# Patient Record
Sex: FEMALE | Race: BLACK OR AFRICAN AMERICAN | NOT HISPANIC OR LATINO | Employment: FULL TIME | URBAN - NONMETROPOLITAN AREA
[De-identification: names, ages, dates, MRNs, and addresses within clinical notes are randomized per-mention and may not be internally consistent; named-entity substitution may affect disease eponyms.]

---

## 2017-01-04 RX ORDER — DOCUSATE SODIUM 100 MG/1
100 CAPSULE, LIQUID FILLED ORAL DAILY PRN
Qty: 30 CAPSULE | Refills: 10 | Status: SHIPPED | OUTPATIENT
Start: 2017-01-04 | End: 2017-04-04

## 2017-01-04 RX ORDER — DOXYCYCLINE HYCLATE 50 MG/1
324 CAPSULE, GELATIN COATED ORAL
Qty: 90 TABLET | Refills: 10 | Status: SHIPPED | OUTPATIENT
Start: 2017-01-04 | End: 2017-04-04

## 2017-01-10 ENCOUNTER — HOSPITAL ENCOUNTER (OUTPATIENT)
Facility: HOSPITAL | Age: 22
Setting detail: SURGERY ADMIT
End: 2017-01-10
Attending: OBSTETRICS & GYNECOLOGY | Admitting: OBSTETRICS & GYNECOLOGY

## 2017-01-13 ENCOUNTER — ROUTINE PRENATAL (OUTPATIENT)
Dept: OBSTETRICS AND GYNECOLOGY | Facility: CLINIC | Age: 22
End: 2017-01-13

## 2017-01-13 VITALS — SYSTOLIC BLOOD PRESSURE: 106 MMHG | DIASTOLIC BLOOD PRESSURE: 60 MMHG | WEIGHT: 157 LBS

## 2017-01-13 DIAGNOSIS — Z36.9 ENCOUNTER FOR ANTENATAL SCREENING: ICD-10-CM

## 2017-01-13 DIAGNOSIS — Z34.83 SUPERVISION OF NORMAL INTRAUTERINE PREGNANCY IN MULTIGRAVIDA IN THIRD TRIMESTER: Primary | ICD-10-CM

## 2017-01-13 DIAGNOSIS — Z3A.35 35 WEEKS GESTATION OF PREGNANCY: ICD-10-CM

## 2017-01-13 PROCEDURE — 99212 OFFICE O/P EST SF 10 MIN: CPT | Performed by: OBSTETRICS & GYNECOLOGY

## 2017-01-13 NOTE — MR AVS SNAPSHOT
Jo Ybarra   2017 9:00 AM   Routine Prenatal    Dept Phone:  [phone]   Encounter #:  [id number]    Provider:  Homero Humphreys MD   Department:  [facility]                Your Full Care Plan              Your Updated Medication List          This list is accurate as of: 17  9:36 AM.  Always use your most recent med list.                docusate sodium 100 MG capsule   Commonly known as:  COLACE   Take 1 capsule by mouth Daily As Needed for constipation.       ferrous gluconate 324 MG tablet   Commonly known as:  FERGON   Take 1 tablet by mouth 3 (Three) Times a Day With Meals.       prenatal vitamin 27-0.8 27-0.8 MG tablet tablet               We Performed the Following     Group B Strep (Molecular)       You Were Diagnosed With        Codes Comments    Supervision of normal intrauterine pregnancy in multigravida in third trimester    -  Primary ICD-10-CM: Z34.83  ICD-9-CM: V22.1     Fracture of both fibulas, open type I or II, with nonunion, subsequent encounter     ICD-10-CM: S82.401M, S82.402M  ICD-9-CM: 733.82     35 weeks gestation of pregnancy     ICD-10-CM: Z3A.35  ICD-9-CM: V22.2     Encounter for  screening     ICD-10-CM: Z36  ICD-9-CM: V28.9       Instructions     None    Patient Instructions History      Upcoming Appointments     Visit Type Date Time Department    OB FOLLOWUP 2017  9:00 AM [facility]    OB FOLLOWUP 2017  9:30 AM [facility] [place] Signup     Our records indicate that you have declined [facility] sign[facility]. If you would like to sign up for [facility], please email [email or web address] or call [phone] to obtain an activation code.             Other Info from Your Visit           Your Appointments     2017  9:30 AM CST   OB FOLLOWUP with Homero Humphreys MD   [facility] (--)    [address] [place] [address] [address]  [facility] [address]   [address]  [facility] [address]   [phone]              Allergies     No Known Allergies      Reason for Visit     Routine Prenatal Visit           Vital Signs     Blood Pressure Weight Smoking Status             106/60 157 lb (71.2 kg) Never Smoker         Problems and Diagnoses Noted     35 weeks gestation of pregnancy    Supervision of normal intrauterine pregnancy in multigravida in third trimester    Fracture of both fibulas, open type I or II, with nonunion, subsequent encounter        Encounter for  screening          No Longer an Issue     33 weeks gestation of pregnancy

## 2017-01-20 ENCOUNTER — ROUTINE PRENATAL (OUTPATIENT)
Dept: OBSTETRICS AND GYNECOLOGY | Facility: CLINIC | Age: 22
End: 2017-01-20

## 2017-01-20 VITALS — WEIGHT: 159 LBS | SYSTOLIC BLOOD PRESSURE: 104 MMHG | DIASTOLIC BLOOD PRESSURE: 58 MMHG

## 2017-01-20 DIAGNOSIS — Z3A.36 36 WEEKS GESTATION OF PREGNANCY: ICD-10-CM

## 2017-01-20 DIAGNOSIS — Z34.83 SUPERVISION OF NORMAL INTRAUTERINE PREGNANCY IN MULTIGRAVIDA IN THIRD TRIMESTER: Primary | ICD-10-CM

## 2017-01-20 PROBLEM — Z3A.35 35 WEEKS GESTATION OF PREGNANCY: Status: RESOLVED | Noted: 2017-01-13 | Resolved: 2017-01-20

## 2017-01-20 PROCEDURE — 99212 OFFICE O/P EST SF 10 MIN: CPT | Performed by: OBSTETRICS & GYNECOLOGY

## 2017-01-20 NOTE — MR AVS SNAPSHOT
Jo Ybarra   1/20/2017 9:30 AM   Routine Prenatal    Dept Phone:  [phone]   Encounter #:  [id number]    Provider:  Homero Humphreys MD   Department:  [facility]                Your Full Care Plan              Your Updated Medication List          This list is accurate as of: 1/20/17 10:15 AM.  Always use your most recent med list.                docusate sodium 100 MG capsule   Commonly known as:  COLACE   Take 1 capsule by mouth Daily As Needed for constipation.       ferrous gluconate 324 MG tablet   Commonly known as:  FERGON   Take 1 tablet by mouth 3 (Three) Times a Day With Meals.       prenatal vitamin 27-0.8 27-0.8 MG tablet tablet               You Were Diagnosed With        Codes Comments    Supervision of normal intrauterine pregnancy in multigravida in third trimester    -  Primary ICD-10-CM: Z34.83  ICD-9-CM: V22.1     36 weeks gestation of pregnancy     ICD-10-CM: Z3A.36  ICD-9-CM: V22.2       Instructions     None    Patient Instructions History      Upcoming Appointments     Visit Type Date Time Department    OB FOLLOWUP 1/20/2017  9:30 AM [facility]    OB FOLLOWUP 1/27/2017 10:00 AM [facility]      MyChart Signup     Our records indicate that you have declined [facility] signup. If you would like to sign up for [employer], please email [email or web address] or call [phone] to obtain an activation code.             Other Info from Your Visit           Your Appointments     Jan 27, 2017 10:00 AM CST   OB FOLLOWUP with Homero Humphreys MD   [facility] (--)    [address] [place] [address] [address]  [facility] [address] [place] [address]   [phone]              Other Notes About Your Plan     GBS +        Allergies     No Known Allergies      Reason for Visit     Routine Prenatal Visit           Vital Signs     Blood Pressure Weight Smoking Status             104/58 159 lb (72.1 kg)  Never Smoker         Problems and Diagnoses Noted     36 weeks gestation of pregnancy    Supervision of normal intrauterine pregnancy in multigravida in third trimester      No Longer an Issue     35 weeks gestation of pregnancy

## 2017-01-27 ENCOUNTER — ROUTINE PRENATAL (OUTPATIENT)
Dept: OBSTETRICS AND GYNECOLOGY | Facility: CLINIC | Age: 22
End: 2017-01-27

## 2017-01-27 VITALS — DIASTOLIC BLOOD PRESSURE: 60 MMHG | SYSTOLIC BLOOD PRESSURE: 94 MMHG

## 2017-01-27 DIAGNOSIS — Z34.83 SUPERVISION OF NORMAL INTRAUTERINE PREGNANCY IN MULTIGRAVIDA IN THIRD TRIMESTER: Primary | ICD-10-CM

## 2017-01-27 DIAGNOSIS — Z3A.37 37 WEEKS GESTATION OF PREGNANCY: ICD-10-CM

## 2017-01-27 PROBLEM — Z3A.36 36 WEEKS GESTATION OF PREGNANCY: Status: RESOLVED | Noted: 2017-01-20 | Resolved: 2017-01-27

## 2017-01-27 PROCEDURE — 99212 OFFICE O/P EST SF 10 MIN: CPT | Performed by: OBSTETRICS & GYNECOLOGY

## 2017-01-27 NOTE — PROGRESS NOTES
Chart is more complete on [facility]CO[facility]  FHT 150s today  Doing well at today's visit  Prior C/S x1, scheduled for repeat 2/6  GBS +

## 2017-02-03 ENCOUNTER — ROUTINE PRENATAL (OUTPATIENT)
Dept: OBSTETRICS AND GYNECOLOGY | Facility: CLINIC | Age: 22
End: 2017-02-03

## 2017-02-03 VITALS — WEIGHT: 160 LBS | SYSTOLIC BLOOD PRESSURE: 88 MMHG | DIASTOLIC BLOOD PRESSURE: 62 MMHG

## 2017-02-03 DIAGNOSIS — Z34.83 SUPERVISION OF NORMAL INTRAUTERINE PREGNANCY IN MULTIGRAVIDA IN THIRD TRIMESTER: Primary | ICD-10-CM

## 2017-02-03 DIAGNOSIS — Z3A.38 38 WEEKS GESTATION OF PREGNANCY: ICD-10-CM

## 2017-02-03 PROCEDURE — 99212 OFFICE O/P EST SF 10 MIN: CPT | Performed by: OBSTETRICS & GYNECOLOGY

## 2017-02-05 ENCOUNTER — HOSPITAL ENCOUNTER (OUTPATIENT)
Dept: LABOR AND DELIVERY | Facility: HOSPITAL | Age: 22
Setting detail: SURGERY ADMIT
Discharge: HOME | End: 2017-02-05

## 2017-02-05 VITALS — WEIGHT: 158 LBS | HEIGHT: 61 IN | BODY MASS INDEX: 29.83 KG/M2

## 2017-02-06 ENCOUNTER — ANESTHESIA (OUTPATIENT)
Dept: LABOR AND DELIVERY | Facility: HOSPITAL | Age: 22
End: 2017-02-06

## 2017-02-06 ENCOUNTER — HOSPITAL ENCOUNTER (INPATIENT)
Facility: HOSPITAL | Age: 22
LOS: 2 days | Discharge: HOME | End: 2017-02-08
Attending: OBSTETRICS & GYNECOLOGY | Admitting: OBSTETRICS & GYNECOLOGY

## 2017-02-06 ENCOUNTER — ANESTHESIA EVENT (OUTPATIENT)
Dept: LABOR AND DELIVERY | Facility: HOSPITAL | Age: 22
End: 2017-02-06

## 2017-02-06 DIAGNOSIS — D62 ACUTE BLOOD LOSS ANEMIA: Primary | ICD-10-CM

## 2017-02-06 PROBLEM — O36.5990 IUGR (INTRAUTERINE GROWTH RESTRICTION) AFFECTING CARE OF MOTHER: Status: ACTIVE | Noted: 2017-02-06

## 2017-02-06 LAB
ABO GROUP BLD: NORMAL
AMPHET+METHAMPHET UR QL: NEGATIVE
BARBITURATES UR QL SCN: NEGATIVE
BENZODIAZ UR QL SCN: NEGATIVE
BLD GP AB SCN SERPL QL: NEGATIVE
CANNABINOIDS SERPL QL: NEGATIVE
COCAINE UR QL: NEGATIVE
DEPRECATED RDW RBC AUTO: 40 FL (ref 36.4–46.3)
ERYTHROCYTE [DISTWIDTH] IN BLOOD BY AUTOMATED COUNT: 14.6 % (ref 11.5–14.5)
HCT VFR BLD AUTO: 28.1 % (ref 35–45)
HGB BLD-MCNC: 9.1 G/DL (ref 12–15.5)
Lab: NORMAL
MCH RBC QN AUTO: 24.3 PG (ref 26.5–34)
MCHC RBC AUTO-ENTMCNC: 32.4 G/DL (ref 31.4–36)
MCV RBC AUTO: 74.9 FL (ref 80–98)
METHADONE UR QL SCN: NEGATIVE
OPIATES UR QL: NEGATIVE
OXYCODONE UR QL SCN: NEGATIVE
PLATELET # BLD AUTO: 246 10*3/MM3 (ref 150–450)
PMV BLD AUTO: 11.3 FL (ref 8–12)
RBC # BLD AUTO: 3.75 10*6/MM3 (ref 3.77–5.16)
RH BLD: POSITIVE
WBC NRBC COR # BLD: 10.09 10*3/MM3 (ref 3.2–9.8)

## 2017-02-06 PROCEDURE — 25010000002 MORPHINE PER 10 MG: Performed by: NURSE ANESTHETIST, CERTIFIED REGISTERED

## 2017-02-06 PROCEDURE — 25010000002 PHENYLEPHRINE PER 1 ML: Performed by: NURSE ANESTHETIST, CERTIFIED REGISTERED

## 2017-02-06 PROCEDURE — 25010000003 CEFAZOLIN PER 500 MG: Performed by: OBSTETRICS & GYNECOLOGY

## 2017-02-06 PROCEDURE — 86900 BLOOD TYPING SEROLOGIC ABO: CPT

## 2017-02-06 PROCEDURE — 80307 DRUG TEST PRSMV CHEM ANLYZR: CPT | Performed by: OBSTETRICS & GYNECOLOGY

## 2017-02-06 PROCEDURE — 59515 CESAREAN DELIVERY: CPT | Performed by: OBSTETRICS & GYNECOLOGY

## 2017-02-06 PROCEDURE — 86850 RBC ANTIBODY SCREEN: CPT

## 2017-02-06 PROCEDURE — 51703 INSERT BLADDER CATH COMPLEX: CPT

## 2017-02-06 PROCEDURE — 25010000002 FENTANYL CITRATE (PF) 100 MCG/2ML SOLUTION: Performed by: NURSE ANESTHETIST, CERTIFIED REGISTERED

## 2017-02-06 PROCEDURE — 86901 BLOOD TYPING SEROLOGIC RH(D): CPT

## 2017-02-06 PROCEDURE — 85027 COMPLETE CBC AUTOMATED: CPT | Performed by: OBSTETRICS & GYNECOLOGY

## 2017-02-06 RX ORDER — FERROUS SULFATE TAB EC 324 MG (65 MG FE EQUIVALENT) 324 (65 FE) MG
324 TABLET DELAYED RESPONSE ORAL
Status: DISCONTINUED | OUTPATIENT
Start: 2017-02-06 | End: 2017-02-07 | Stop reason: SDUPTHER

## 2017-02-06 RX ORDER — MORPHINE SULFATE 2 MG/ML
2 INJECTION, SOLUTION INTRAMUSCULAR; INTRAVENOUS
Status: DISCONTINUED | OUTPATIENT
Start: 2017-02-06 | End: 2017-02-08 | Stop reason: HOSPADM

## 2017-02-06 RX ORDER — BISACODYL 10 MG
10 SUPPOSITORY, RECTAL RECTAL DAILY PRN
Status: DISCONTINUED | OUTPATIENT
Start: 2017-02-07 | End: 2017-02-08 | Stop reason: HOSPADM

## 2017-02-06 RX ORDER — ZOLPIDEM TARTRATE 5 MG/1
5 TABLET ORAL NIGHTLY PRN
Status: DISCONTINUED | OUTPATIENT
Start: 2017-02-06 | End: 2017-02-08 | Stop reason: HOSPADM

## 2017-02-06 RX ORDER — ACETAMINOPHEN 500 MG
1000 TABLET ORAL EVERY 6 HOURS PRN
Status: DISCONTINUED | OUTPATIENT
Start: 2017-02-06 | End: 2017-02-08 | Stop reason: HOSPADM

## 2017-02-06 RX ORDER — SODIUM CHLORIDE, SODIUM LACTATE, POTASSIUM CHLORIDE, CALCIUM CHLORIDE 600; 310; 30; 20 MG/100ML; MG/100ML; MG/100ML; MG/100ML
INJECTION, SOLUTION INTRAVENOUS
Status: DISPENSED
Start: 2017-02-06 | End: 2017-02-06

## 2017-02-06 RX ORDER — MORPHINE SULFATE 1 MG/ML
INJECTION, SOLUTION EPIDURAL; INTRATHECAL; INTRAVENOUS AS NEEDED
Status: DISCONTINUED | OUTPATIENT
Start: 2017-02-06 | End: 2017-02-06 | Stop reason: SURG

## 2017-02-06 RX ORDER — NALOXONE HCL 0.4 MG/ML
0.04 VIAL (ML) INJECTION
Status: DISCONTINUED | OUTPATIENT
Start: 2017-02-06 | End: 2017-02-08 | Stop reason: HOSPADM

## 2017-02-06 RX ORDER — SODIUM CHLORIDE, SODIUM LACTATE, POTASSIUM CHLORIDE, CALCIUM CHLORIDE 600; 310; 30; 20 MG/100ML; MG/100ML; MG/100ML; MG/100ML
INJECTION, SOLUTION INTRAVENOUS
Status: COMPLETED
Start: 2017-02-06 | End: 2017-02-06

## 2017-02-06 RX ORDER — SODIUM CHLORIDE, SODIUM LACTATE, POTASSIUM CHLORIDE, CALCIUM CHLORIDE 600; 310; 30; 20 MG/100ML; MG/100ML; MG/100ML; MG/100ML
125 INJECTION, SOLUTION INTRAVENOUS CONTINUOUS
Status: DISCONTINUED | OUTPATIENT
Start: 2017-02-06 | End: 2017-02-06

## 2017-02-06 RX ORDER — OXYCODONE HYDROCHLORIDE AND ACETAMINOPHEN 5; 325 MG/1; MG/1
2 TABLET ORAL EVERY 4 HOURS PRN
Status: DISCONTINUED | OUTPATIENT
Start: 2017-02-06 | End: 2017-02-08 | Stop reason: HOSPADM

## 2017-02-06 RX ORDER — FENTANYL CITRATE 50 UG/ML
INJECTION, SOLUTION INTRAMUSCULAR; INTRAVENOUS AS NEEDED
Status: DISCONTINUED | OUTPATIENT
Start: 2017-02-06 | End: 2017-02-06 | Stop reason: SURG

## 2017-02-06 RX ORDER — ONDANSETRON 2 MG/ML
4 INJECTION INTRAMUSCULAR; INTRAVENOUS ONCE AS NEEDED
Status: ACTIVE | OUTPATIENT
Start: 2017-02-06 | End: 2017-02-07

## 2017-02-06 RX ORDER — OXYCODONE HYDROCHLORIDE AND ACETAMINOPHEN 5; 325 MG/1; MG/1
1 TABLET ORAL EVERY 4 HOURS PRN
Status: DISCONTINUED | OUTPATIENT
Start: 2017-02-06 | End: 2017-02-08 | Stop reason: HOSPADM

## 2017-02-06 RX ORDER — OXYTOCIN/RINGER'S LACTATE 20/1000 ML
PLASTIC BAG, INJECTION (ML) INTRAVENOUS
Status: COMPLETED
Start: 2017-02-06 | End: 2017-02-06

## 2017-02-06 RX ORDER — SODIUM CHLORIDE 0.9 % (FLUSH) 0.9 %
1-10 SYRINGE (ML) INJECTION AS NEEDED
Status: DISCONTINUED | OUTPATIENT
Start: 2017-02-06 | End: 2017-02-06

## 2017-02-06 RX ORDER — DOCUSATE SODIUM 100 MG/1
100 CAPSULE, LIQUID FILLED ORAL 2 TIMES DAILY PRN
Status: DISCONTINUED | OUTPATIENT
Start: 2017-02-06 | End: 2017-02-08 | Stop reason: HOSPADM

## 2017-02-06 RX ORDER — PRENATAL VIT/IRON FUM/FOLIC AC 27MG-0.8MG
1 TABLET ORAL DAILY
Status: DISCONTINUED | OUTPATIENT
Start: 2017-02-06 | End: 2017-02-08 | Stop reason: HOSPADM

## 2017-02-06 RX ORDER — BUPIVACAINE HYDROCHLORIDE 7.5 MG/ML
INJECTION, SOLUTION EPIDURAL; RETROBULBAR AS NEEDED
Status: DISCONTINUED | OUTPATIENT
Start: 2017-02-06 | End: 2017-02-06 | Stop reason: SURG

## 2017-02-06 RX ORDER — OXYTOCIN/RINGER'S LACTATE 20/1000 ML
150 PLASTIC BAG, INJECTION (ML) INTRAVENOUS CONTINUOUS
Status: ACTIVE | OUTPATIENT
Start: 2017-02-06 | End: 2017-02-07

## 2017-02-06 RX ORDER — IBUPROFEN 800 MG/1
800 TABLET ORAL EVERY 8 HOURS SCHEDULED
Status: DISCONTINUED | OUTPATIENT
Start: 2017-02-06 | End: 2017-02-08 | Stop reason: HOSPADM

## 2017-02-06 RX ORDER — SODIUM CHLORIDE 0.9 % (FLUSH) 0.9 %
1-10 SYRINGE (ML) INJECTION AS NEEDED
Status: DISCONTINUED | OUTPATIENT
Start: 2017-02-06 | End: 2017-02-08 | Stop reason: HOSPADM

## 2017-02-06 RX ORDER — DOCUSATE SODIUM 100 MG/1
100 CAPSULE, LIQUID FILLED ORAL DAILY PRN
Status: DISCONTINUED | OUTPATIENT
Start: 2017-02-06 | End: 2017-02-08 | Stop reason: SDUPTHER

## 2017-02-06 RX ADMIN — MORPHINE SULFATE 0.15 MG: 1 INJECTION, SOLUTION EPIDURAL; INTRATHECAL; INTRAVENOUS at 07:35

## 2017-02-06 RX ADMIN — IBUPROFEN 800 MG: 800 TABLET ORAL at 14:14

## 2017-02-06 RX ADMIN — Medication 150 ML/HR: at 12:05

## 2017-02-06 RX ADMIN — Medication 200 ML: at 07:55

## 2017-02-06 RX ADMIN — Medication 100 ML: at 07:50

## 2017-02-06 RX ADMIN — PHENYLEPHRINE HYDROCHLORIDE 50 MCG: 10 INJECTION INTRAVENOUS at 07:40

## 2017-02-06 RX ADMIN — FENTANYL CITRATE 15 MCG: 50 INJECTION, SOLUTION INTRAMUSCULAR; INTRAVENOUS at 07:35

## 2017-02-06 RX ADMIN — BUPIVACAINE HYDROCHLORIDE 1.4 ML: 7.5 INJECTION, SOLUTION EPIDURAL; RETROBULBAR at 07:35

## 2017-02-06 RX ADMIN — SODIUM CHLORIDE, POTASSIUM CHLORIDE, SODIUM LACTATE AND CALCIUM CHLORIDE: 600; 310; 30; 20 INJECTION, SOLUTION INTRAVENOUS at 07:28

## 2017-02-06 RX ADMIN — CEFAZOLIN SODIUM 2 G: 1 INJECTION, POWDER, FOR SOLUTION INTRAMUSCULAR; INTRAVENOUS at 07:37

## 2017-02-06 RX ADMIN — PHENYLEPHRINE HYDROCHLORIDE 100 MCG: 10 INJECTION INTRAVENOUS at 07:37

## 2017-02-06 NOTE — H&P
Jo Ybarra  : 1995  MRN: [id number]  [facility]: [id number]    History and Physical    Jo Ybarra is a 21 y.o. year old  with an Estimated Date of Delivery: 17 presenting for  delivery due to prior C/S.  She is not planning for sterilization at the time of the .    Her prenatal care has been benign.    Obstetric History       T1      TAB0   SAB0   E0   M0   L1       # Outcome Date GA Lbr Patel/2nd Weight Sex Delivery Anes PTL Lv   2 Current            1 Term 11   7 lb 8 oz (3402 g) M CS-LTranv Spinal N Y      Complications: Failure to Progress in First Stage        Past Medical History   Diagnosis Date   • Abdominal pain    • Acute bronchitis    • Acute gastroenteritis    • Acute tonsillitis    • Allergic rhinitis    • Common cold    • Cough    • Dysuria    • Eczema    • Encounter for general counseling on prescription of oral contraceptives    • Fever    • Follow-up exam after treatment      encounter for follow-up exam after completed treatment for conditions other than malignant neoplasm   • Headache    • Health maintenance examination      individual health exam   • Helminth infection      possible roundworm   • Infection of skin      localized left ear   • Infectious gastroenteritis    • Right lower quadrant pain    • Streptococcal sore throat    • Surveillance of implantable subdermal contraceptive      .   • Upper respiratory infection    • Vaginal discharge      Past Surgical History   Procedure Laterality Date   •  section primary  2011   • Injection of medication  2013     toradol   • [place] tooth extraction         Current Facility-Administered Medications:   •  ceFAZolin (ANCEF) 2 g in sodium chloride 0.9 % 100 mL IVPB, 2 g, Intravenous, Once, Homero Humphreys MD  •  citric acid-sodium citrate (BICITRA) solution 30 mL, 30 mL, Oral, Once, Homero Humphreys MD  •  lactated ringers bolus 1,000 mL, 1,000 mL,  Intravenous, Once, Homero Humphreys MD  •  lactated ringers infusion, 125 mL/hr, Intravenous, Continuous, Homero Humphreys MD  •  sodium chloride 0.9 % flush 1-10 mL, 1-10 mL, Intravenous, PRN, Homero Humphreys MD    No Known Allergies  Smoking status: Former Smoker                                                              Packs/day: 0.25      Years: 2.00         Types: Cigarettes     Quit date: 2016     Smokeless status: Not on file                     Comment: pt declined    Review of Systems    There were no vitals taken for this visit.  General: well developed; well nourished  no acute distress   Heart: Not performed.   Lungs: breathing is unlabored   Abdomen: soft, non-tender; no masses  no umbilical or inginual hernias are present  no hepato-splenomegaly     Prenatal Labs  Lab Results   Component Value Date    HEPBSAG Negative 2016    ABO O 2016    RH POS 2016    HEPCVIRUSABY Negative 2016       Recent Labs  Lab Results   Component Value Date    HGB 9.2 (L) 2016    HCT 28.6 (L) 2016    WBC 9.8 2016     2016        Assessment   1. IUP with an Estimated Date of Delivery: 17  2. Planned  section due to previous C/S - declines   3. GBS +     Plan   Repeat  The risks, benefits. and alternatives to  section were discussed.  The risks that were discussed included, but were not limited to, pain, infection, bleeding, hemorrhage; including need for transfusion to which she would have no objection; injury to the bowel, bladder, uterus, tubes, ovaries, or baby which could require further surgery or prolonged hospitalization.  The patient understands that we'll have leg pumps on her legs to try and reduce the risk of clot formation her legs.  She understands that we will have early ambulation to also reduce the risk of blood clot formation and to reduce the risk of pneumonia.  She will be given antibiotics prior  to her surgery to help decrease the risk for infection.  All questions were answered.        This document has been electronically signed by Homero Humphreys MD on February 6, 2017 7:01 AM

## 2017-02-06 NOTE — ANESTHESIA POSTPROCEDURE EVALUATION
Patient: Jo Ybarra    Procedure Summary     Date Anesthesia Start Anesthesia Stop Room / Location    17 0729 0826 [facility] LABOR DELIVERY  / [facility] LABOR DELIVERY       Procedure Diagnosis Surgeon Provider     SECTION REPEAT (N/A Abdomen) No diagnosis on file. MD Lai Jones CRNA          Anesthesia Type: spinal  Last vitals  BP      Temp      Pulse     Resp      SpO2        Post Anesthesia Care and Evaluation    Patient location during evaluation: bedside  Patient participation: complete - patient participated  Level of consciousness: awake and alert  Pain management: adequate  Airway patency: patent  Anesthetic complications: No anesthetic complications  PONV Status: none  Cardiovascular status: acceptable  Respiratory status: acceptable  Hydration status: acceptable  Post Neuraxial Block status: No signs or symptoms of PDPH

## 2017-02-06 NOTE — ANESTHESIA PROCEDURE NOTES
Spinal Block    Patient location during procedure: OR  Indication:at surgeon's request  Performed By  CRNA: ALICE QUINTANA  Preanesthetic Checklist  Completed: patient identified, site marked, surgical consent, pre-op evaluation, timeout performed, IV checked, risks and benefits discussed and monitors and equipment checked  Spinal Block Prep:  Patient Position:sitting  Sterile Tech:cap, gloves, gown, mask and sterile barriers  Prep:DuraPrep  Patient Monitoring:blood pressure monitoring, continuous pulse oximetry and EKG  Spinal Block Procedure  Approach:midline  Guidance:landmark technique and palpation technique  Location:L3-L4  Needle Type:Pencan  Needle Gauge:24 G  Fluid Appearance:clear  Post Assessment  Patient Tolerance:patient tolerated the procedure well with no apparent complications  Complications no

## 2017-02-06 NOTE — OP NOTE
[facility]  Jo Ybarra  : 1995  MRN: [id number]  CSN: [id number]     Section Operative Note    Pre-Operative Dx:   1. IUP at 39w1d weeks   2. Prior C/S x1      Postoperative dx:    1. Same as above       Procedure: Repeat  (LTCS)       Surgeon: Homero Humphreys MD   Assistant: Rubi Davidson       Anesthesia: Spinal        EBL: 500 mls.   IV Fluids: 600 mls.         Antibiotics: cefazolin 1 gms     Infant:           Gender: female  infant    Weight: No birth weight on file.     Apgars:    @ 1 minute /        @ 5 minutes    Cord gases: Venous:  @BABYNOHDR(BRIEFLAB, PHCVEN, BECVEN)@     Arterial:  @BABYNOHDR(BRIEFLAB, PHCART, BECART)@     Procedure Details:   Prepped and draped in the normal sterile fashion. Dorsal supine position with a slight leftward tilt. Pfannensteil incision made with scalpel and carried through to the underlying layer of fascia with the bovie. Fascia was incised in the midline and this incision was extended bilaterally with sharp disection. Superior aspect of fascia was elevated and the underlying layer of muscle dissected off with sharp and blunt dissection. Inferior aspect in similar fashion. Rectus muscles  in the midline. Peritoneum entered sharply and this was extended superiorly and inferiorly. Hysterotomy was made with scalpel and extended bluntly. The infants head was delivered atraumatically which was promptly followed by the rest of the infant. The cord was clamped and cut and infant was passed off to the awaiting nursing staff. Placenta was delivered spontaneously. The uterus was exteriorized and cleared of all clots and debris. Hysterotomy was closed with 0 Vicryl and additional figure of 8 sutures applied for hemostasis. Uterus was returned to the patient's abdomen and gutters cleared of all clot. Again hemostasis was noted. Fascia was reaproximated with vicryl and irrigated. The skin was closed with monocryl subcuticular stitch.    All counts correct.        Complications:   None      Disposition:   Mother to Mother Baby/Postpartum  in stable condition currently.   Baby to remains with mom  in stable condition currently.       Homero Humphreys MD  2/6/2017  8:07 AM

## 2017-02-06 NOTE — ANESTHESIA PREPROCEDURE EVALUATION
Anesthesia Evaluation      Airway   Mallampati: II  TM distance: >3 FB  Neck ROM: full  no difficulty expected  Dental - normal exam     Pulmonary - negative pulmonary ROS and normal exam   Cardiovascular - negative cardio ROS and normal exam    Neuro/Psych  (+) headaches,    GI/Hepatic/Renal/Endo - negative ROS     Musculoskeletal (-) negative ROS    Abdominal  - normal exam   Substance History - negative use     OB/GYN    (+) Pregnant,         Other - negative ROS                            Anesthesia Plan    ASA 2     spinal     Anesthetic plan and risks discussed with patient.

## 2017-02-07 LAB
ANISOCYTOSIS BLD QL: NORMAL
BASOPHILS # BLD AUTO: 0.03 10*3/MM3 (ref 0–0.2)
BASOPHILS NFR BLD AUTO: 0.2 % (ref 0–2)
DEPRECATED RDW RBC AUTO: 41.4 FL (ref 36.4–46.3)
EOSINOPHIL # BLD AUTO: 0.11 10*3/MM3 (ref 0–0.7)
EOSINOPHIL NFR BLD AUTO: 0.9 % (ref 0–7)
ERYTHROCYTE [DISTWIDTH] IN BLOOD BY AUTOMATED COUNT: 15 % (ref 11.5–14.5)
HCT VFR BLD AUTO: 24.5 % (ref 35–45)
HGB BLD-MCNC: 7.8 G/DL (ref 12–15.5)
IMM GRANULOCYTES # BLD: 0.04 10*3/MM3 (ref 0–0.02)
IMM GRANULOCYTES NFR BLD: 0.3 % (ref 0–0.5)
LYMPHOCYTES # BLD AUTO: 2.77 10*3/MM3 (ref 0.6–4.2)
LYMPHOCYTES NFR BLD AUTO: 21.9 % (ref 10–50)
MCH RBC QN AUTO: 23.8 PG (ref 26.5–34)
MCHC RBC AUTO-ENTMCNC: 31.8 G/DL (ref 31.4–36)
MCV RBC AUTO: 74.7 FL (ref 80–98)
MONOCYTES # BLD AUTO: 1.26 10*3/MM3 (ref 0–0.9)
MONOCYTES NFR BLD AUTO: 10 % (ref 0–12)
NEUTROPHILS # BLD AUTO: 8.41 10*3/MM3 (ref 2–8.6)
NEUTROPHILS NFR BLD AUTO: 66.7 % (ref 37–80)
NRBC BLD MANUAL-RTO: 0 /100 WBC (ref 0–0)
PLATELET # BLD AUTO: 198 10*3/MM3 (ref 150–450)
PMV BLD AUTO: ABNORMAL FL (ref 8–12)
POLYCHROMASIA BLD QL SMEAR: NORMAL
RBC # BLD AUTO: 3.28 10*6/MM3 (ref 3.77–5.16)
SMALL PLATELETS BLD QL SMEAR: ADEQUATE
WBC MORPH BLD: NORMAL
WBC NRBC COR # BLD: 12.62 10*3/MM3 (ref 3.2–9.8)

## 2017-02-07 PROCEDURE — 85025 COMPLETE CBC W/AUTO DIFF WBC: CPT | Performed by: OBSTETRICS & GYNECOLOGY

## 2017-02-07 PROCEDURE — 85007 BL SMEAR W/DIFF WBC COUNT: CPT | Performed by: OBSTETRICS & GYNECOLOGY

## 2017-02-07 RX ORDER — FERROUS SULFATE TAB EC 324 MG (65 MG FE EQUIVALENT) 324 (65 FE) MG
324 TABLET DELAYED RESPONSE ORAL 2 TIMES DAILY WITH MEALS
Status: CANCELLED | OUTPATIENT
Start: 2017-02-07

## 2017-02-07 RX ORDER — FERROUS SULFATE TAB EC 324 MG (65 MG FE EQUIVALENT) 324 (65 FE) MG
325 TABLET DELAYED RESPONSE ORAL 2 TIMES DAILY WITH MEALS
Status: DISCONTINUED | OUTPATIENT
Start: 2017-02-07 | End: 2017-02-08 | Stop reason: HOSPADM

## 2017-02-07 RX ADMIN — IBUPROFEN 800 MG: 800 TABLET ORAL at 13:41

## 2017-02-07 RX ADMIN — IBUPROFEN 800 MG: 800 TABLET ORAL at 22:52

## 2017-02-07 RX ADMIN — FERROUS SULFATE TAB EC 324 MG (65 MG FE EQUIVALENT) 324 MG: 324 (65 FE) TABLET DELAYED RESPONSE at 17:20

## 2017-02-07 RX ADMIN — FERROUS SULFATE TAB EC 324 MG (65 MG FE EQUIVALENT) 324 MG: 324 (65 FE) TABLET DELAYED RESPONSE at 08:18

## 2017-02-07 RX ADMIN — PRENATAL VIT W/ FE FUMARATE-FA TAB 27-0.8 MG 1 TABLET: 27-0.8 TAB at 08:19

## 2017-02-07 RX ADMIN — OXYCODONE HYDROCHLORIDE AND ACETAMINOPHEN 1 TABLET: 5; 325 TABLET ORAL at 19:12

## 2017-02-07 RX ADMIN — OXYCODONE HYDROCHLORIDE AND ACETAMINOPHEN 1 TABLET: 5; 325 TABLET ORAL at 03:12

## 2017-02-07 RX ADMIN — IBUPROFEN 800 MG: 800 TABLET ORAL at 08:16

## 2017-02-07 RX ADMIN — DOCUSATE SODIUM 100 MG: 100 CAPSULE, LIQUID FILLED ORAL at 17:20

## 2017-02-07 NOTE — PROGRESS NOTES
[facility]  Jo Ybarra  : 1995  MRN: [id number]  CSN: [id number]    Postpartum Day #1  Subjective   Jo Ybarra was seen and examined in AM NAD. Pt states pain is well controlled on current medications. (+)Ambulation, Lochea WNL, (-)BM and (+)Flatus. Pt is bottle feeding. Pt plans to use Nexplanon.     Objective     Min/max vitals past 24 hours:   Temp  Min: 97.2 °F (36.2 °C)  Max: 98.4 °F (36.9 °C)  BP  Min: 96/54  Max: 121/67  Pulse  Min: 58  Max: 85  Pulse  Min: 58  Max: 85                        Gen: A&Ox3, NAD          CV: RRR, S1 S2 apreciated, no murmurs, rubs, gallops          Respiratory: CTAB, bilateral symetrical chest rise.  Abdomen: soft, tender; normal bowel sounds; no masses, Fundus firm and below the umbilicus, Incision C/D/I   Pelvic: Deferred         Extremities: No edema, No erythema    Lab Results   Component Value Date    WBC 10.09 (H) 2017    HGB 9.1 (L) 2017    HCT 28.1 (L) 2017    MCV 74.9 (L) 2017     2017    RH Positive 2017    HEPBSAG Negative 2016        Assessment  Jo Ybarra is a 21 y.o. year old  s/p Repeat Low Transverse CS day 1  1. Postpartum Day 1 S/P Repeat Low Transverse CS     Plan   1. Continue routine post op care - encourage ambulation  2. Pt is bottle feeding  3. Pt plans on Nexplanon as birth control option    Camryn Suggs, Medical Student  2017  5:20 AM

## 2017-02-07 NOTE — PLAN OF CARE
Problem: Patient Care Overview (Adult)  Goal: Plan of Care Review  Outcome: Ongoing (interventions implemented as appropriate)    17 1732   Coping/Psychosocial Response Interventions   Plan Of Care Reviewed With patient   Patient Care Overview   Progress improving   Outcome Evaluation   Outcome Summary/Follow up Plan Retains feedings, voids and stools, lungs clear, HR regular with no murmurs heard, plans to follow-up with Dr Ramírez       Goal: Adult Individualization and Mutuality  Outcome: Ongoing (interventions implemented as appropriate)  Goal: Discharge Needs Assessment  Outcome: Ongoing (interventions implemented as appropriate)    Problem: Postpartum ( Delivery) (Adult)  Goal: Signs and Symptoms of Listed Potential Problems Will be Absent or Manageable (Postpartum)  Outcome: Ongoing (interventions implemented as appropriate)

## 2017-02-07 NOTE — PLAN OF CARE
Problem: Patient Care Overview (Adult)  Goal: Plan of Care Review  Outcome: Ongoing (interventions implemented as appropriate)    17 1855   Coping/Psychosocial Response Interventions   Plan Of Care Reviewed With patient   Patient Care Overview   Progress no change   Outcome Evaluation   Outcome Summary/Follow up Plan Pt too tired to ambulate, states she will soon. Pain is well controlled, no complaints.        Goal: Adult Individualization and Mutuality  Outcome: Ongoing (interventions implemented as appropriate)    17 2295   Individualization   Patient Specific Preferences pt requesting to rest   Patient Specific Goals ambulation around the room, d/c torres and SCDs   Mutuality/Individual Preferences   What Anxieties, Fears or Concerns Do You Have About Your Health or Care? none   What Questions Do You Have About Your Health or Care? none   What Information Would Help Us Give You More Personalized Care? n/a       Goal: Discharge Needs Assessment  Outcome: Ongoing (interventions implemented as appropriate)    Problem: Postpartum ( Delivery) (Adult)  Goal: Signs and Symptoms of Listed Potential Problems Will be Absent or Manageable (Postpartum)  Outcome: Ongoing (interventions implemented as appropriate)

## 2017-02-07 NOTE — PROGRESS NOTES
[facility]  Jo Ybarra  : 1995  MRN: [id number]  CSN: [id number]    Postpartum Day # 1  Subjective   Jo Ybarra was seen and examined in AM NAD. Pt states pain is well controlled on current medications. (+)Ambulation, Loche[place] WNL, (-)BM and (+)Flatus. Pt is bottle feeding. Pt plans to use Nexplanon.     Objective     Min/max vitals past 24 hours:   Temp  Min: 97.2 °F (36.2 °C)  Max: 98.3 °F (36.8 °C)  BP  Min: 96/56  Max: 112/53  Pulse  Min: 64  Max: 85  Pulse  Min: 64  Max: 85                        Gen: A&Ox3, NAD          HEENT: NC/AT, no facial asymetry          CV: RRR, S1 S2 apreciated, no murmurs, rubs, gallops          Respiratory: CTAB, bilateral symetrical chest rise.  Abdomen: soft, not tender; normal bowel sounds; no masses, Fundus firm and below the umbilicus, Incision C/D/I   Pelvic: Deferred         Extremities: No edema, No erythema    Lab Results   Component Value Date    WBC 12.62 (H) 2017    HGB 7.8 (L) 2017    HCT 24.5 (L) 2017    MCV 74.7 (L) 2017     2017    RH Positive 2017    HEPBSAG Negative 2016        Assessment  Jo Ybarra is a 21 y.o. year old  s/p Repeat Low Transverse CS day 1  1. Postpartum Day 1 S/P Repeat Low Transverse CS     Plan   1. Continue routine post op care - encourage ambulation  2. Pt is bottle feeding  3. Pt plans on Nexplanon as birth control option    Jazmine Henning MD  2017  2:30 PM             This document has been electronically signed by Jazmine Henning MD on 2017 2:30 PM

## 2017-02-07 NOTE — PLAN OF CARE
Problem: Patient Care Overview (Adult)  Goal: Adult Individualization and Mutuality  Outcome: Ongoing (interventions implemented as appropriate)  Pt voided since torres out and ambulated in room.  Tolerated all well.  Pain controlled.  Pt rested through most of the night.  Fundus firm and lochia small.      17 0608   Individualization   Patient Specific Preferences pt request rest during the night   Patient Specific Goals d/c torres   Patient Specific Interventions none   Mutuality/Individual Preferences   What Anxieties, Fears or Concerns Do You Have About Your Health or Care? none   What Questions Do You Have About Your Health or Care? none   What Information Would Help Us Give You More Personalized Care? none       Goal: Discharge Needs Assessment  Outcome: Ongoing (interventions implemented as appropriate)    Problem: Postpartum ( Delivery) (Adult)  Goal: Signs and Symptoms of Listed Potential Problems Will be Absent or Manageable (Postpartum)  Outcome: Ongoing (interventions implemented as appropriate)

## 2017-02-07 NOTE — PLAN OF CARE
Problem: Patient Care Overview (Adult)  Goal: Plan of Care Review  Outcome: Ongoing (interventions implemented as appropriate)  Pt slept through most of the night.  Baby feeding well.  Fundus firm and lochia small.  Encouraged to get a shower this morning.  Pt has voided since d/c torres and ambulated in room tolerating all well.      17 0608   Coping/Psychosocial Response Interventions   Plan Of Care Reviewed With patient   Patient Care Overview   Progress improving       Goal: Adult Individualization and Mutuality  Outcome: Ongoing (interventions implemented as appropriate)  Goal: Discharge Needs Assessment  Outcome: Ongoing (interventions implemented as appropriate)    Problem: Postpartum ( Delivery) (Adult)  Goal: Signs and Symptoms of Listed Potential Problems Will be Absent or Manageable (Postpartum)  Outcome: Ongoing (interventions implemented as appropriate)

## 2017-02-08 VITALS
RESPIRATION RATE: 18 BRPM | HEART RATE: 78 BPM | TEMPERATURE: 98.7 F | SYSTOLIC BLOOD PRESSURE: 131 MMHG | OXYGEN SATURATION: 99 % | DIASTOLIC BLOOD PRESSURE: 68 MMHG

## 2017-02-08 PROBLEM — O36.5990 IUGR (INTRAUTERINE GROWTH RESTRICTION) AFFECTING CARE OF MOTHER: Status: RESOLVED | Noted: 2017-02-06 | Resolved: 2017-02-08

## 2017-02-08 LAB
ANISOCYTOSIS BLD QL: NORMAL
BASOPHILS # BLD AUTO: 0.04 10*3/MM3 (ref 0–0.2)
BASOPHILS NFR BLD AUTO: 0.4 % (ref 0–2)
DEPRECATED RDW RBC AUTO: 40.1 FL (ref 36.4–46.3)
EOSINOPHIL # BLD AUTO: 0.18 10*3/MM3 (ref 0–0.7)
EOSINOPHIL NFR BLD AUTO: 1.7 % (ref 0–7)
ERYTHROCYTE [DISTWIDTH] IN BLOOD BY AUTOMATED COUNT: 14.7 % (ref 11.5–14.5)
HCT VFR BLD AUTO: 25.4 % (ref 35–45)
HGB BLD-MCNC: 8.2 G/DL (ref 12–15.5)
IMM GRANULOCYTES # BLD: 0.03 10*3/MM3 (ref 0–0.02)
IMM GRANULOCYTES NFR BLD: 0.3 % (ref 0–0.5)
LYMPHOCYTES # BLD AUTO: 2.86 10*3/MM3 (ref 0.6–4.2)
LYMPHOCYTES NFR BLD AUTO: 27.8 % (ref 10–50)
MCH RBC QN AUTO: 24.2 PG (ref 26.5–34)
MCHC RBC AUTO-ENTMCNC: 32.3 G/DL (ref 31.4–36)
MCV RBC AUTO: 74.9 FL (ref 80–98)
MONOCYTES # BLD AUTO: 1.01 10*3/MM3 (ref 0–0.9)
MONOCYTES NFR BLD AUTO: 9.8 % (ref 0–12)
NEUTROPHILS # BLD AUTO: 6.18 10*3/MM3 (ref 2–8.6)
NEUTROPHILS NFR BLD AUTO: 60 % (ref 37–80)
PLATELET # BLD AUTO: 268 10*3/MM3 (ref 150–450)
PMV BLD AUTO: 11.3 FL (ref 8–12)
POLYCHROMASIA BLD QL SMEAR: NORMAL
RBC # BLD AUTO: 3.39 10*6/MM3 (ref 3.77–5.16)
SMALL PLATELETS BLD QL SMEAR: ADEQUATE
WBC MORPH BLD: NORMAL
WBC NRBC COR # BLD: 10.3 10*3/MM3 (ref 3.2–9.8)

## 2017-02-08 PROCEDURE — 85025 COMPLETE CBC W/AUTO DIFF WBC: CPT | Performed by: OBSTETRICS & GYNECOLOGY

## 2017-02-08 PROCEDURE — 85007 BL SMEAR W/DIFF WBC COUNT: CPT | Performed by: OBSTETRICS & GYNECOLOGY

## 2017-02-08 RX ORDER — OXYCODONE HYDROCHLORIDE AND ACETAMINOPHEN 5; 325 MG/1; MG/1
1 TABLET ORAL EVERY 4 HOURS PRN
Qty: 30 TABLET | Refills: 0 | Status: SHIPPED | OUTPATIENT
Start: 2017-02-08 | End: 2017-02-16

## 2017-02-08 RX ADMIN — IBUPROFEN 800 MG: 800 TABLET ORAL at 05:24

## 2017-02-08 RX ADMIN — FERROUS SULFATE TAB EC 324 MG (65 MG FE EQUIVALENT) 324 MG: 324 (65 FE) TABLET DELAYED RESPONSE at 09:29

## 2017-02-08 RX ADMIN — PRENATAL VIT W/ FE FUMARATE-FA TAB 27-0.8 MG 1 TABLET: 27-0.8 TAB at 09:28

## 2017-02-08 RX ADMIN — IBUPROFEN 800 MG: 800 TABLET ORAL at 14:56

## 2017-02-08 NOTE — DISCHARGE SUMMARY
[facility]  Jo Ybarra  : 1995  MRN: [id number]  CSN: [id number]    Discharge Summary      Date of Admission: 2017   Date of Discharge:    Principle Discharge Dx: 1. z34.83   Procedures Performed: Procedure(s):   SECTION REPEAT   Brief History: Patient is a 21 y.o. now  who presented to labor and delivery for elective RLTCS.   Hospital Course: Her post-operative course was unremarkable.  On POD # 2 she expressed the desire for D/C.  She was evaluated by Dr. Humphreys who agreed she was able to be discharged to home.  She had no febrile morbidity. She had normal bowel and bladder function and was hemodynamically stable with a D/C HgB of  8.2.  Her wound was healing well without obvious signs of infections.   Pending Studies: none   Discharge Medications:    Your medication list      START taking these medications       Instructions Last Dose Given Next Dose Due    oxyCODONE-acetaminophen 5-325 MG per tablet   Commonly known as:  PERCOCET        Take 1 tablet by mouth Every 4 (Four) Hours As Needed for moderate pain (4-6) or severe pain (7-10) for up to 8 days.           CONTINUE taking these medications       Instructions Last Dose Given Next Dose Due    docusate sodium 100 MG capsule   Commonly known as:  COLACE        Take 1 capsule by mouth Daily As Needed for constipation.         ferrous gluconate 324 MG tablet   Commonly known as:  FERGON        Take 1 tablet by mouth 3 (Three) Times a Day With Meals.         prenatal vitamin 27-0.8 27-0.8 MG tablet tablet                   Where to Get Your Medications      You can get these medications from any pharmacy     Bring a paper prescription for each of these medications    • oxyCODONE-acetaminophen 5-325 MG per tablet            Discharge Disposition: home   Follow-up: Future Appointments  Date Time Provider Department Center   2017 10:45 AM Homero Humphreys MD [facility] None          This note has been  electronically signed.    Homero Humphreys MD  February 8, 2017

## 2017-02-08 NOTE — PROGRESS NOTES
[facility]  Jo Ybarra  : 1995  MRN: [id number]  CSN: [id number]    Postpartum Day #1  Subjective   Jo Ybarra was seen and examined in AM NAD. Pt states pain is well controlled on current medications. (+)Ambulation, Lochea WNL, (+)BM and (+)Flatus. Pt is bottle feeding. Pt plans to use Nexplanon.     Objective     Min/max vitals past 24 hours:   Temp  Min: 98 °F (36.7 °C)  Max: 98.4 °F (36.9 °C)  BP  Min: 96/56  Max: 114/55  Pulse  Min: 70  Max: 90  Pulse  Min: 70  Max: 90                        Gen: A&Ox3, NAD          HEENT: NC/AT, PERRL, EOMI, no facial asymetry          CV: RRR, S1 S2 apreciated, no murmurs, rubs, gallops          Respiratory: CTAB, bilateral symetrical chest rise.  Abdomen: soft, not tender; normal bowel sounds; no masses, Fundus firm and below the umbilicus, Incision C/D/I   Pelvic: Deferred         Extremities: No edema, No erythema    Lab Results   Component Value Date    WBC 12.62 (H) 2017    HGB 7.8 (L) 2017    HCT 24.5 (L) 2017    MCV 74.7 (L) 2017     2017    RH Positive 2017    HEPBSAG Negative 2016        Assessment  Jo Ybarra is a 21 y.o. year old  s/p Repeat Low Transverse CS day 2  1. Postpartum Day 2 S/P Repeat Low Transverse CS     Plan   1. Continue routine post op care - encourage ambulation  2. Pt is bottle feeding   3. Pt plans on Nexplanon as birth control option    Stefania Workman, Medical Student  2017  6:30 AM

## 2017-02-08 NOTE — DISCHARGE PLACEMENT REQUEST
"Jodie Apodaca (21 y.o. Female)     Date of Birth Social Security Number Address Home Phone MRN    1995  [address] [place] [id number] [phone] [id number]    Sikhism Marital Status          None Single       Admission Date Admission Type Admitting Provider Attending Provider Department, Room/Bed    17 Elective Homero Humphreys MD Stevens, Joshua David, MD [facility] MOTHER BABY, M758/1    Discharge Date Discharge Disposition Discharge Destination         Home or Self Care             Attending Provider: Homero Humphreys MD     Allergies:  No Known Allergies    Isolation:  None   Infection:  None   Code Status:  FULL    Ht:  61\" (154.9 cm)   Wt:  158 lb (71.7 kg)    Admission Cmt:  None   Principal Problem:  None                Active Insurance as of 2017     Primary Coverage     Payor Plan Insurance Group Employer/Plan Group    AET [employer] [place] [facility] [email or web address] [facility]      Payor Plan Address Payor Plan Phone Number Effective From Effective To    [address] [address]  2016     [place] [address]       Subscriber Name Subscriber Birth Date Member ID       JODIE APODACA 1995 [id number]                 Emergency Contacts      (Rel.) Home Phone Work Phone Mobile Phone    Hollie Apodaca (Mother) -- -- [phone]               Operative/Procedure Notes (last 72 hours) (Notes from 2017 12:20 PM through 2017 12:20 PM)      Homero Humphreys MD at 2017  8:06 AM  Version 1 of [address] [place] [address]  Jodie Apodaca  : 1995  MRN: [id number]  CSN: [id number]     Section Operative Note    Pre-Operative Dx:   1. IUP at 39w1d weeks   2. Prior C/S x1      Postoperative dx:    1. Same as above       Procedure: Repeat  (LTCS)       Surgeon: Homero Humphreys MD   Assistant: Rubi Davidson       Anesthesia: Spinal        EBL: 500 mls.   IV Fluids: 600 mls.         Antibiotics: " cefazolin 1 gms     Infant:           Gender: female  infant    Weight: No birth weight on file.     Apgars:    @ 1 minute /        @ 5 minutes    Cord gases: Venous:  @BABYNOHDR(BRIEFLAB, PHCVEN, BECVEN)@     Arterial:  @BABYNOHDR(BRIEFLAB, PHCART, BECART)@     Procedure Details:   Prepped and draped in the normal sterile fashion. Dorsal supine position with a slight leftward tilt. Pfannensteil incision made with scalpel and carried through to the underlying layer of fascia with the bovie. Fascia was incised in the midline and this incision was extended bilaterally with sharp disection. Superior aspect of fascia was elevated and the underlying layer of muscle dissected off with sharp and blunt dissection. Inferior aspect in similar fashion. Rectus muscles  in the midline. Peritoneum entered sharply and this was extended superiorly and inferiorly. Hysterotomy was made with scalpel and extended bluntly. The infants head was delivered atraumatically which was promptly followed by the rest of the infant. The cord was clamped and cut and infant was passed off to the awaiting nursing staff. Placenta was delivered spontaneously. The uterus was exteriorized and cleared of all clots and debris. Hysterotomy was closed with 0 Vicryl and additional figure of 8 sutures applied for hemostasis. Uterus was returned to the patient's abdomen and gutters cleared of all clot. Again hemostasis was noted. Fascia was reaproximated with vicryl and irrigated. The skin was closed with monocryl subcuticular stitch.   All counts correct.        Complications:   None      Disposition:   Mother to Mother Baby/Postpartum  in stable condition currently.   Baby to remains with mom  in stable condition currently.       Homero Humphreys MD  2/6/2017  8:07 AM       Electronically signed by Homero Humphreys MD at 2/6/2017  8:10 AM

## 2017-02-08 NOTE — PAYOR COMM NOTE
"Jo Apodaca (21 y.o. Female)     Date of Birth Social Security Number Address Home Phone MRN    1995  [address] [place] [id number] [phone] [id number]    Spiritism Marital Status          None Single       Admission Date Admission Type Admitting Provider Attending Provider Department, Room/Bed    2/6/17 Elective Homero Humphreys MD Stevens, Joshua David, MD [facility] MOTHER BABY, M758/1    Discharge Date Discharge Disposition Discharge Destination         Home or Self Care             Attending Provider: Homero Humphreys MD     Allergies:  No Known Allergies    Isolation:  None   Infection:  None   Code Status:  FULL    Ht:  61\" (154.9 cm)   Wt:  158 lb (71.7 kg)    Admission Cmt:  None   Principal Problem:  None                Active Insurance as of 2/6/2017     Primary Coverage     Payor Plan Insurance Group Employer/Plan Group    [facility] [employer] [place] [facility] [address] [facility]      Payor Plan Address Payor Plan Phone Number Effective From Effective To    [address] [address]  12/1/2016     [place] [address]       Subscriber Name Subscriber Birth Date Member ID       JO APODACA 1995 [id number]                 Emergency Contacts      (Rel.) Home Phone Work Phone Mobile Phone    Hollie Apodaca (Mother) -- -- [phone]            Lines, Drains & Airways    Active LDAs     None         Inactive LDAs     Name:   Placement date:   Placement time:   Removal date:   Removal time:   Site:   Days:    [REMOVED] Peripheral IV Line - Single Lumen 02/06/17 0515 metacarpal vein (top of hand), right 20 gauge;1 in length  02/06/17    0515    02/07/17    0900      1    [REMOVED] Urethral Catheter 02/06/17 0738 100% silicone 10  02/06/17    0738    02/06/17    1945      less than 1                "

## 2017-02-08 NOTE — PLAN OF CARE
Problem: Patient Care Overview (Adult)  Goal: Plan of Care Review  Outcome: Ongoing (interventions implemented as appropriate)    17 1745 17 1925 17 0503   Coping/Psychosocial Response Interventions   Plan Of Care Reviewed With --  patient --    Patient Care Overview   Progress improving --  --    Outcome Evaluation   Outcome Summary/Follow up Plan --  --  Ambulating in monzon, pain controlled with Motrin and Percocet X1 this shift. Hope to go home today. WIC needed.        Goal: Adult Individualization and Mutuality  Outcome: Ongoing (interventions implemented as appropriate)    Problem: Postpartum ( Delivery) (Adult)  Goal: Signs and Symptoms of Listed Potential Problems Will be Absent or Manageable (Postpartum)  Outcome: Ongoing (interventions implemented as appropriate)

## 2017-04-04 ENCOUNTER — OFFICE VISIT (OUTPATIENT)
Dept: OBSTETRICS AND GYNECOLOGY | Facility: CLINIC | Age: 22
End: 2017-04-04

## 2017-04-04 VITALS
HEIGHT: 60 IN | WEIGHT: 141 LBS | SYSTOLIC BLOOD PRESSURE: 110 MMHG | DIASTOLIC BLOOD PRESSURE: 64 MMHG | BODY MASS INDEX: 27.68 KG/M2

## 2017-04-04 DIAGNOSIS — Z30.017 ENCOUNTER FOR INSERTION SUBDERMAL CONTRACEPTIVE: ICD-10-CM

## 2017-04-04 PROCEDURE — 0503F POSTPARTUM CARE VISIT: CPT | Performed by: OBSTETRICS & GYNECOLOGY

## 2017-04-04 PROCEDURE — 11981 INSERTION DRUG DLVR IMPLANT: CPT | Performed by: OBSTETRICS & GYNECOLOGY

## 2017-04-04 NOTE — PROGRESS NOTES
Subjective   Jo Ybarra is a 21 y.o. female.     HPI Comments: Patient presents today for routine postpartum visit.  Now 7 weeks status post repeat low transverse .  LMP started on .  No pain.  Bottlefeeding.  Not feeling down or depressed.  Desires Nexplanon for contraception.  Discussed risks benefits and alternatives for Nexplanon and patient would like to proceed.        Review of Systems   All other systems reviewed and are negative.      Objective   Physical Exam   Constitutional: She is oriented to person, place, and time. She appears well-developed and well-nourished. No distress.   HENT:   Head: Normocephalic and atraumatic.   Right Ear: External ear normal.   Left Ear: External ear normal.   Nose: Nose normal.   Mouth/Throat: Oropharynx is clear and moist. No oropharyngeal exudate.   Eyes: Conjunctivae and EOM are normal. Pupils are equal, round, and reactive to light. Right eye exhibits no discharge. Left eye exhibits no discharge. No scleral icterus.   Neck: Normal range of motion. Neck supple. No tracheal deviation present. No thyromegaly present.   Cardiovascular: Normal rate, regular rhythm, normal heart sounds and intact distal pulses.  Exam reveals no gallop and no friction rub.    No murmur heard.  Pulmonary/Chest: Effort normal and breath sounds normal. No respiratory distress. She has no wheezes. She has no rales. She exhibits no mass, no tenderness, no laceration, no deformity and no retraction. Right breast exhibits no inverted nipple, no mass, no nipple discharge, no skin change and no tenderness. Left breast exhibits no inverted nipple, no mass, no nipple discharge, no skin change and no tenderness. Breasts are symmetrical. There is no breast swelling.   Abdominal: Soft. She exhibits no distension and no mass. There is no tenderness. There is no rebound and no guarding. No hernia.   ICDI   Genitourinary: Vagina normal and uterus normal. No breast tenderness, discharge  or bleeding. Pelvic exam was performed with patient supine. No labial fusion. There is no rash, tenderness, lesion or injury on the right labia. There is no rash, tenderness, lesion or injury on the left labia. Uterus is not deviated, not enlarged, not fixed and not tender. Cervix exhibits no motion tenderness, no discharge and no friability. Right adnexum displays no mass, no tenderness and no fullness. Left adnexum displays no mass, no tenderness and no fullness. No erythema, tenderness or bleeding in the vagina. No foreign body in the vagina. No signs of injury around the vagina. No vaginal discharge found.   Genitourinary Comments: Scant blood in vault   Musculoskeletal: Normal range of motion. She exhibits no edema or deformity.   Neurological: She is alert and oriented to person, place, and time. No cranial nerve deficit. Coordination normal.   Skin: Skin is warm and dry. No rash noted. She is not diaphoretic. No erythema. No pallor.   Psychiatric: She has a normal mood and affect. Her behavior is normal. Judgment and thought content normal.        Nexplanon Insertion    No LMP recorded.    Date of procedure:  4/4/2017    Risks and benefits discussed? yes  All questions answered? yes  Consents given by the patient  Written consent obtained? yes    Local anesthesia used:  yes - 6 cc's of  Meds; anesthesia local: 1% lidocaine  NDC number:                               5982-8014-55    Procedure documentation:    The upper left arm (non-dominant) was marked at the intended site of insertion. The skin was cleansed with an antiseptic solution.  Local anesthesia was injected.  The Nexplanon was placed subdermally without difficulty.  The devise was able to be palpated in the arm.  Steri-strips were then placed across the site of insertion and the arm was wrapped.    She tolerated the procedure well.  There were no complications.  EBL was minimal.    Post procedure instructions: Remove the wrapping in 6-24 hours and  the steri-strips in 2-5 days.    Follow up needed: PRN    This note was electronically signed.    Homero Humphreys MD  April 4, 2017      Assessment/Plan   Jo was seen today for post-op.    Diagnoses and all orders for this visit:    Routine postpartum follow-up    Encounter for insertion subdermal contraceptive    Other orders  -     Etonogestrel (NEXPLANON) 68 MG subdermal implant; Inject  into the skin 1 (One) Time.       F/u 1 year and PRN

## 2018-09-17 ENCOUNTER — PROCEDURE VISIT (OUTPATIENT)
Dept: OBSTETRICS AND GYNECOLOGY | Facility: CLINIC | Age: 23
End: 2018-09-17

## 2018-09-17 VITALS
BODY MASS INDEX: 31.1 KG/M2 | DIASTOLIC BLOOD PRESSURE: 70 MMHG | HEIGHT: 60 IN | SYSTOLIC BLOOD PRESSURE: 120 MMHG | WEIGHT: 158.4 LBS

## 2018-09-17 DIAGNOSIS — Z30.46 ENCOUNTER FOR REMOVAL OF SUBDERMAL CONTRACEPTIVE IMPLANT: Primary | ICD-10-CM

## 2018-09-17 PROCEDURE — 11982 REMOVE DRUG IMPLANT DEVICE: CPT | Performed by: OBSTETRICS & GYNECOLOGY

## 2018-09-17 NOTE — PROGRESS NOTES
Nexplanon Removal    Date of procedure:  9/17/2018    Risks and benefits discussed? yes  All questions answered? yes  Consents given by the patient  Written consent obtained? yes    Local anesthesia used:  yes - 3 cc's of  Meds; anesthesia local: 1% lidocaine    Procedure documentation:    The upper left arm was marked at the intended site of removal.  The skin was cleansed with an antispetic solution.  Local anesthesia was injected.  A vertical incision was created at the distal tip of the implant.  The implant was removed intact without difficulty.  Steri-strips were then placed across the site of insertion and the arm was wrapped.    She tolerated the procedure well.  There were no complications.  EBL was minimal.    Post procedure instructions: Remove the wrapping in 24 hours and the steri-strips in 5 days.    Follow up needed: PRN    This note was electronically signed.    Homero Humphreys MD  September 17, 2018

## 2019-05-02 ENCOUNTER — INITIAL PRENATAL (OUTPATIENT)
Dept: OBSTETRICS AND GYNECOLOGY | Facility: CLINIC | Age: 24
End: 2019-05-02

## 2019-05-02 ENCOUNTER — APPOINTMENT (OUTPATIENT)
Dept: LAB | Facility: HOSPITAL | Age: 24
End: 2019-05-02

## 2019-05-02 VITALS — SYSTOLIC BLOOD PRESSURE: 106 MMHG | BODY MASS INDEX: 31.64 KG/M2 | DIASTOLIC BLOOD PRESSURE: 69 MMHG | WEIGHT: 162 LBS

## 2019-05-02 VITALS — SYSTOLIC BLOOD PRESSURE: 106 MMHG | WEIGHT: 162 LBS | BODY MASS INDEX: 31.64 KG/M2 | DIASTOLIC BLOOD PRESSURE: 69 MMHG

## 2019-05-02 DIAGNOSIS — Z36.3 ENCOUNTER FOR ANTENATAL SCREENING FOR MALFORMATION USING ULTRASOUND: ICD-10-CM

## 2019-05-02 DIAGNOSIS — Z3A.22 22 WEEKS GESTATION OF PREGNANCY: ICD-10-CM

## 2019-05-02 DIAGNOSIS — O36.80X0 ENCOUNTER TO DETERMINE FETAL VIABILITY OF PREGNANCY, SINGLE OR UNSPECIFIED FETUS: ICD-10-CM

## 2019-05-02 DIAGNOSIS — O99.322 DRUG USE AFFECTING PREGNANCY IN SECOND TRIMESTER: ICD-10-CM

## 2019-05-02 DIAGNOSIS — Z34.80 PRENATAL CARE OF MULTIGRAVIDA, ANTEPARTUM: Primary | ICD-10-CM

## 2019-05-02 DIAGNOSIS — O34.211 ENCOUNTER FOR MATERNAL CARE FOR LOW TRANSVERSE SCAR FROM PREVIOUS CESAREAN DELIVERY: Primary | ICD-10-CM

## 2019-05-02 DIAGNOSIS — Z78.9 DATE OF LAST MENSTRUAL PERIOD (LMP) UNKNOWN: ICD-10-CM

## 2019-05-02 DIAGNOSIS — Z98.891 HISTORY OF C-SECTION: ICD-10-CM

## 2019-05-02 DIAGNOSIS — Z87.891 FORMER SMOKER: ICD-10-CM

## 2019-05-02 DIAGNOSIS — F12.11 MARIJUANA ABUSE IN REMISSION: ICD-10-CM

## 2019-05-02 LAB
ABO GROUP BLD: NORMAL
AMPHET+METHAMPHET UR QL: NEGATIVE
BARBITURATES UR QL SCN: NEGATIVE
BASOPHILS # BLD AUTO: 0.05 10*3/MM3 (ref 0–0.2)
BASOPHILS NFR BLD AUTO: 0.5 % (ref 0–1.5)
BENZODIAZ UR QL SCN: NEGATIVE
BILIRUB UR QL STRIP: NEGATIVE
BLD GP AB SCN SERPL QL: NEGATIVE
CANNABINOIDS SERPL QL: POSITIVE
CLARITY UR: CLEAR
COCAINE UR QL: NEGATIVE
COLOR UR: YELLOW
DEPRECATED RDW RBC AUTO: 40.4 FL (ref 37–54)
EOSINOPHIL # BLD AUTO: 0.15 10*3/MM3 (ref 0–0.4)
EOSINOPHIL NFR BLD AUTO: 1.4 % (ref 0.3–6.2)
ERYTHROCYTE [DISTWIDTH] IN BLOOD BY AUTOMATED COUNT: 13.4 % (ref 12.3–15.4)
GLUCOSE UR STRIP-MCNC: NEGATIVE MG/DL
HBV SURFACE AG SERPL QL IA: NORMAL
HCT VFR BLD AUTO: 33.6 % (ref 34–46.6)
HCV AB SER DONR QL: NORMAL
HGB BLD-MCNC: 11 G/DL (ref 12–15.9)
HGB UR QL STRIP.AUTO: NEGATIVE
HIV1+2 AB SER QL: NORMAL
IMM GRANULOCYTES # BLD AUTO: 0.05 10*3/MM3 (ref 0–0.05)
IMM GRANULOCYTES NFR BLD AUTO: 0.5 % (ref 0–0.5)
KETONES UR QL STRIP: NEGATIVE
LEUKOCYTE ESTERASE UR QL STRIP.AUTO: NEGATIVE
LYMPHOCYTES # BLD AUTO: 2.32 10*3/MM3 (ref 0.7–3.1)
LYMPHOCYTES NFR BLD AUTO: 21.9 % (ref 19.6–45.3)
Lab: NORMAL
MCH RBC QN AUTO: 27.2 PG (ref 26.6–33)
MCHC RBC AUTO-ENTMCNC: 32.7 G/DL (ref 31.5–35.7)
MCV RBC AUTO: 83 FL (ref 79–97)
METHADONE UR QL SCN: NEGATIVE
MONOCYTES # BLD AUTO: 0.66 10*3/MM3 (ref 0.1–0.9)
MONOCYTES NFR BLD AUTO: 6.2 % (ref 5–12)
NEUTROPHILS # BLD AUTO: 7.34 10*3/MM3 (ref 1.7–7)
NEUTROPHILS NFR BLD AUTO: 69.5 % (ref 42.7–76)
NITRITE UR QL STRIP: NEGATIVE
NRBC BLD AUTO-RTO: 0 /100 WBC (ref 0–0.2)
OPIATES UR QL: NEGATIVE
OXYCODONE UR QL SCN: NEGATIVE
PH UR STRIP.AUTO: 6.5 [PH] (ref 5–8)
PLATELET # BLD AUTO: 234 10*3/MM3 (ref 140–450)
PMV BLD AUTO: 12.1 FL (ref 6–12)
PROT UR QL STRIP: NEGATIVE
RBC # BLD AUTO: 4.05 10*6/MM3 (ref 3.77–5.28)
RH BLD: POSITIVE
SP GR UR STRIP: 1.03 (ref 1–1.03)
UROBILINOGEN UR QL STRIP: NORMAL
WBC NRBC COR # BLD: 10.57 10*3/MM3 (ref 3.4–10.8)

## 2019-05-02 PROCEDURE — 87491 CHLMYD TRACH DNA AMP PROBE: CPT | Performed by: NURSE PRACTITIONER

## 2019-05-02 PROCEDURE — 81003 URINALYSIS AUTO W/O SCOPE: CPT | Performed by: NURSE PRACTITIONER

## 2019-05-02 PROCEDURE — 80307 DRUG TEST PRSMV CHEM ANLYZR: CPT | Performed by: NURSE PRACTITIONER

## 2019-05-02 PROCEDURE — 83021 HEMOGLOBIN CHROMOTOGRAPHY: CPT | Performed by: NURSE PRACTITIONER

## 2019-05-02 PROCEDURE — 36415 COLL VENOUS BLD VENIPUNCTURE: CPT

## 2019-05-02 PROCEDURE — 87086 URINE CULTURE/COLONY COUNT: CPT | Performed by: NURSE PRACTITIONER

## 2019-05-02 PROCEDURE — 85660 RBC SICKLE CELL TEST: CPT | Performed by: NURSE PRACTITIONER

## 2019-05-02 PROCEDURE — 86803 HEPATITIS C AB TEST: CPT | Performed by: NURSE PRACTITIONER

## 2019-05-02 PROCEDURE — 80081 OBSTETRIC PANEL INC HIV TSTG: CPT | Performed by: NURSE PRACTITIONER

## 2019-05-02 PROCEDURE — 87661 TRICHOMONAS VAGINALIS AMPLIF: CPT | Performed by: NURSE PRACTITIONER

## 2019-05-02 PROCEDURE — 87591 N.GONORRHOEAE DNA AMP PROB: CPT | Performed by: NURSE PRACTITIONER

## 2019-05-02 PROCEDURE — 99214 OFFICE O/P EST MOD 30 MIN: CPT | Performed by: NURSE PRACTITIONER

## 2019-05-02 RX ORDER — PRENATAL VIT/IRON FUM/FOLIC AC 27MG-0.8MG
TABLET ORAL DAILY
COMMUNITY
End: 2019-09-05

## 2019-05-02 NOTE — PROGRESS NOTES
I spent approximately 60 minutes with the patient acquiring the health and history intake and discussing topics related to healthy lifestyle. This is her third pregnancy. She had 2 prior csections. A newob bag is given. The 1st trimester teaching was done with the patient. We discussed a healthy diet and exercise and what is recommended. I also discussed Listeriosis and Toxoplasmosis and what fish to avoid due to high mercury levels. Informed patient not to be in hot tubs, saunas, or tanning beds. We discussed that spotting may occur after intercourse which is common, but if heavy bleeding like a period occurs to call the [facility] or hospital if clinic is closed.  I encouraged her to make an appointment with the dentist if she has not had a dental exam and cleaning in the last 6 months. I instructed the patient that alcohol, illicit drug use, and tobacco smoking should be avoided in pregnancy. The patient does not smoke but discussed the importance of avoiding second hand smoke. We discussed the hospital policy procedure if a patient has a positive urine drug screen at the time of admission to the hospital. She does not  plan to breastfeed.  I discussed lab tests will be done today. She is interested in the Counsyl test. She would like to know gender of baby. She states she has never had a blood transfusion. Information is given on the TDAP vaccine. All questions were answered at this time.

## 2019-05-02 NOTE — PROGRESS NOTES
CC: First pregnancy visit; hx reviewed and updated.    HPI: Third pregnancy. Previous LTCS x2. Primary c/s was 2/2 failure to dilate and second was a scheduled repeat. Last pap- 16. Thinks that her LMP was in December sometime but was not having regular cycles. Previously smoked cigarettes daily and marijuana 1-2 times per month, and drank beer or liquor on occasion but stopped all of that when she had a positive pregnancy test but that was only a few weeks ago.     ROS: Denies N/V, dysuria, vb, LOF, abnormal vaginal d/c, heartburn, constipation or headaches.     P/E: see NOB exam under episode tab.    Dating scan - EFW 1lb 2oz GA by U/S today- 22w0d with final BEAU of 19.     A/P: 24 y/o  @ 22w0d by 22 week sono here to initiate prenatal care.    1. NOB visit  - Encourage PNV  - PTL precautions  - NOB labs and dating sonogram ordered  - Discussed exercise and weight gain in pregnancy, NOB packet given.  Discussed what food to avoid and medications one can take OTC.  Discussed practice and the number of partners, discussed on call physician potentially being delivering physician.   - Panorama genetic screening drawn today.  - Discussed using small meals, protein, and ginger for relief of nausea; discussed medication to alleviate symptoms, prescription provided for phenergan  - RTC in 1 week for anatomy scan & LEXII visit     2. LTCS x2  - Plans for repeat @ 39 weeks

## 2019-05-03 LAB
BACTERIA SPEC AEROBE CULT: NORMAL
C TRACH RRNA CVX QL NAA+PROBE: NEGATIVE
HGB A MFR BLD: 98.2 % (ref 96.4–98.8)
HGB A2 MFR BLD COLUMN CHROM: 1.8 % (ref 1.8–3.2)
HGB C MFR BLD: 0 %
HGB F MFR BLD: 0 % (ref 0–2)
HGB FRACT BLD-IMP: NORMAL
HGB S BLD QL SOLY: NEGATIVE
HGB S MFR BLD: 0 %
N GONORRHOEA RRNA SPEC QL NAA+PROBE: NEGATIVE
RPR SER QL: NORMAL
RUBV IGG SERPL IA-ACNC: POSITIVE
TRICHOMONAS VAGINALIS PCR: NEGATIVE

## 2019-05-13 ENCOUNTER — TELEPHONE (OUTPATIENT)
Dept: OBSTETRICS AND GYNECOLOGY | Facility: CLINIC | Age: 24
End: 2019-05-13

## 2019-05-13 ENCOUNTER — ROUTINE PRENATAL (OUTPATIENT)
Dept: OBSTETRICS AND GYNECOLOGY | Facility: CLINIC | Age: 24
End: 2019-05-13

## 2019-05-13 VITALS — DIASTOLIC BLOOD PRESSURE: 60 MMHG | SYSTOLIC BLOOD PRESSURE: 100 MMHG | BODY MASS INDEX: 32.42 KG/M2 | WEIGHT: 166 LBS

## 2019-05-13 DIAGNOSIS — Z36.9 ENCOUNTER FOR ANTENATAL SCREENING: ICD-10-CM

## 2019-05-13 DIAGNOSIS — O99.012 ANEMIA IN PREGNANCY, SECOND TRIMESTER: ICD-10-CM

## 2019-05-13 DIAGNOSIS — O99.322 DRUG USE AFFECTING PREGNANCY IN SECOND TRIMESTER: ICD-10-CM

## 2019-05-13 DIAGNOSIS — Z3A.23 23 WEEKS GESTATION OF PREGNANCY: Primary | ICD-10-CM

## 2019-05-13 DIAGNOSIS — O09.32 LATE PRENATAL CARE AFFECTING PREGNANCY IN SECOND TRIMESTER: ICD-10-CM

## 2019-05-13 PROBLEM — O34.211 ENCOUNTER FOR MATERNAL CARE FOR LOW TRANSVERSE SCAR FROM PREVIOUS CESAREAN DELIVERY: Status: RESOLVED | Noted: 2019-05-02 | Resolved: 2019-05-13

## 2019-05-13 PROBLEM — F12.11 MARIJUANA ABUSE IN REMISSION: Status: RESOLVED | Noted: 2019-05-02 | Resolved: 2019-05-13

## 2019-05-13 PROCEDURE — 99213 OFFICE O/P EST LOW 20 MIN: CPT | Performed by: NURSE PRACTITIONER

## 2019-05-13 RX ORDER — FERROUS SULFATE 325(65) MG
325 TABLET ORAL
Qty: 30 TABLET | Refills: 5 | Status: SHIPPED | OUTPATIENT
Start: 2019-05-13 | End: 2019-09-05

## 2019-05-13 NOTE — PROGRESS NOTES
"CC: LEXII care, hx reviewed    ROS: No complaints.  Pt denies cramping/ctx, dysuria, or vb.      P/E: see vitals. Reviewed preliminary anatomy scan report with pt: KIZZY 15.68, placenta anterior on right/no previa, EFW: 1lb 5 oz, all anatomy seen with normal appearance, 3vc, its a boy- \"Henry\", CL 4.31 cm    A/P: 24 yo  @ 23w4d per 22 week scan    1. LEXII visit  -continue pnv  -PTL precautions  -Reviewed NOB labs: +THC    2. Drug use  -pt quit smoking marijuana  -+THC, educated on risks, encouraged continued cessation    3. Hx of c/s x2  -plans to have RCS    4. Late prenatal care  -initial prenatal visit at 22 weeks    5. Anemia with history  -sent in iron once daily with breakfast    RTC in 4 week for LEXII appt, order placed for 1 hour OGTT and cbc  "

## 2019-05-16 DIAGNOSIS — Z36.3 ENCOUNTER FOR ANTENATAL SCREENING FOR MALFORMATION USING ULTRASOUND: ICD-10-CM

## 2019-05-16 DIAGNOSIS — O34.211 ENCOUNTER FOR MATERNAL CARE FOR LOW TRANSVERSE SCAR FROM PREVIOUS CESAREAN DELIVERY: ICD-10-CM

## 2019-05-16 DIAGNOSIS — Z3A.22 22 WEEKS GESTATION OF PREGNANCY: ICD-10-CM

## 2019-06-10 ENCOUNTER — ROUTINE PRENATAL (OUTPATIENT)
Dept: OBSTETRICS AND GYNECOLOGY | Facility: CLINIC | Age: 24
End: 2019-06-10

## 2019-06-10 VITALS — BODY MASS INDEX: 32.22 KG/M2 | DIASTOLIC BLOOD PRESSURE: 69 MMHG | WEIGHT: 165 LBS | SYSTOLIC BLOOD PRESSURE: 111 MMHG

## 2019-06-10 DIAGNOSIS — Z64.1 MULTIPAROUS: ICD-10-CM

## 2019-06-10 DIAGNOSIS — Z3A.27 27 WEEKS GESTATION OF PREGNANCY: Primary | ICD-10-CM

## 2019-06-10 PROCEDURE — 99212 OFFICE O/P EST SF 10 MIN: CPT | Performed by: OBSTETRICS & GYNECOLOGY

## 2019-06-10 NOTE — PROGRESS NOTES
S) She has no complaints.  She denies contractions, leakage of fluid, or vaginal bleeding.  Her baby is active.  O) See above  A/P)  IUP at 27 weeks.  Return in 2 weeks.  Plan glucola next visit.  Fetal movement discussed.

## 2019-07-09 DIAGNOSIS — Z36.9 ENCOUNTER FOR ANTENATAL SCREENING: Primary | ICD-10-CM

## 2019-07-11 DIAGNOSIS — R89.9 ABNORMAL LABORATORY TEST: Primary | ICD-10-CM

## 2019-07-22 ENCOUNTER — ROUTINE PRENATAL (OUTPATIENT)
Dept: OBSTETRICS AND GYNECOLOGY | Facility: CLINIC | Age: 24
End: 2019-07-22

## 2019-07-22 ENCOUNTER — LAB (OUTPATIENT)
Dept: LAB | Facility: HOSPITAL | Age: 24
End: 2019-07-22

## 2019-07-22 VITALS — BODY MASS INDEX: 32.97 KG/M2 | WEIGHT: 168.8 LBS | SYSTOLIC BLOOD PRESSURE: 96 MMHG | DIASTOLIC BLOOD PRESSURE: 72 MMHG

## 2019-07-22 DIAGNOSIS — O09.30 INSUFFICIENT ANTEPARTUM CARE: ICD-10-CM

## 2019-07-22 DIAGNOSIS — Z64.1 MULTIPAROUS: ICD-10-CM

## 2019-07-22 DIAGNOSIS — O99.322 DRUG USE AFFECTING PREGNANCY IN SECOND TRIMESTER: Primary | ICD-10-CM

## 2019-07-22 DIAGNOSIS — Z3A.33 33 WEEKS GESTATION OF PREGNANCY: ICD-10-CM

## 2019-07-22 DIAGNOSIS — O34.211 ENCOUNTER FOR MATERNAL CARE FOR LOW TRANSVERSE SCAR FROM PREVIOUS CESAREAN DELIVERY: ICD-10-CM

## 2019-07-22 DIAGNOSIS — Z36.9 ENCOUNTER FOR ANTENATAL SCREENING: ICD-10-CM

## 2019-07-22 DIAGNOSIS — R89.9 ABNORMAL LABORATORY TEST: ICD-10-CM

## 2019-07-22 PROCEDURE — 80307 DRUG TEST PRSMV CHEM ANLYZR: CPT

## 2019-07-22 PROCEDURE — 85027 COMPLETE CBC AUTOMATED: CPT

## 2019-07-22 PROCEDURE — 99213 OFFICE O/P EST LOW 20 MIN: CPT | Performed by: ADVANCED PRACTICE MIDWIFE

## 2019-07-22 PROCEDURE — 36415 COLL VENOUS BLD VENIPUNCTURE: CPT

## 2019-07-22 PROCEDURE — 83036 HEMOGLOBIN GLYCOSYLATED A1C: CPT

## 2019-07-23 DIAGNOSIS — Z36.9 ENCOUNTER FOR ANTENATAL SCREENING: Primary | ICD-10-CM

## 2019-07-23 LAB
AMPHET+METHAMPHET UR QL: NEGATIVE
BARBITURATES UR QL SCN: NEGATIVE
BENZODIAZ UR QL SCN: NEGATIVE
CANNABINOIDS SERPL QL: POSITIVE
COCAINE UR QL: NEGATIVE
DEPRECATED RDW RBC AUTO: 38.4 FL (ref 37–54)
ERYTHROCYTE [DISTWIDTH] IN BLOOD BY AUTOMATED COUNT: 13.2 % (ref 12.3–15.4)
HBA1C MFR BLD: 5.39 % (ref 4.8–5.6)
HCT VFR BLD AUTO: 31.3 % (ref 34–46.6)
HGB BLD-MCNC: 10.1 G/DL (ref 12–15.9)
MCH RBC QN AUTO: 26.1 PG (ref 26.6–33)
MCHC RBC AUTO-ENTMCNC: 32.3 G/DL (ref 31.5–35.7)
MCV RBC AUTO: 80.9 FL (ref 79–97)
METHADONE UR QL SCN: NEGATIVE
OPIATES UR QL: NEGATIVE
OXYCODONE UR QL SCN: NEGATIVE
PLATELET # BLD AUTO: 239 10*3/MM3 (ref 140–450)
PMV BLD AUTO: 13.1 FL (ref 6–12)
RBC # BLD AUTO: 3.87 10*6/MM3 (ref 3.77–5.28)
WBC NRBC COR # BLD: 12.53 10*3/MM3 (ref 3.4–10.8)

## 2019-07-26 PROBLEM — O09.30 INSUFFICIENT ANTEPARTUM CARE: Status: ACTIVE | Noted: 2019-07-26

## 2019-07-26 PROBLEM — Z64.1 MULTIPAROUS: Status: ACTIVE | Noted: 2019-07-26

## 2019-07-26 PROBLEM — O34.211 ENCOUNTER FOR MATERNAL CARE FOR LOW TRANSVERSE SCAR FROM PREVIOUS CESAREAN DELIVERY: Status: ACTIVE | Noted: 2019-07-26

## 2019-07-26 PROBLEM — O99.322 DRUG USE AFFECTING PREGNANCY IN SECOND TRIMESTER: Status: ACTIVE | Noted: 2019-07-26

## 2019-07-26 NOTE — PROGRESS NOTES
CC: LEXII visit    Patient Active Problem List   Diagnosis   (none) - all problems resolved or deleted       HPI: 23 y.o.   GA: 34w1d BEAU: 2019, by Ultrasound  Here for follow up prenatal care.     Labs:   Lab Results   Component Value Date    HGBA1C 5.39 2019     Glucose   Date Value Ref Range Status   2016 90 60 - 100 mg/dl Final   2016 103 (H) 60 - 100 mg/dl Final   2016 72 60 - 100 mg/dl Final     Last Completed Pap Smear       Status Date      PAP SMEAR Done 2016 CONVERTED (HISTORICAL) GYN CYTOLOGY          Radiology:    Each of the above were reviewed and integrated into prenatal care plan.    P/E:  See Vitals flowsheet    A/P:    1. Routine prenatal care   Encourage PNV   PTL precautions  Jo has missed recent appt. Discussed with her the importance of regular prenatal care.      2.    Diagnosis Plan   1. Drug use affecting pregnancy in third trimester  UDS, THC+   2. 33 weeks gestation of pregnancy  Hgb A1c, CBC   3. Multiparous     4. Encounter for maternal care for low transverse scar from previous  delivery     5. Insufficient antepartum care

## 2019-08-07 ENCOUNTER — ROUTINE PRENATAL (OUTPATIENT)
Dept: OBSTETRICS AND GYNECOLOGY | Facility: CLINIC | Age: 24
End: 2019-08-07

## 2019-08-07 VITALS — WEIGHT: 168 LBS | SYSTOLIC BLOOD PRESSURE: 98 MMHG | DIASTOLIC BLOOD PRESSURE: 60 MMHG | BODY MASS INDEX: 32.81 KG/M2

## 2019-08-07 DIAGNOSIS — O09.30 INSUFFICIENT ANTEPARTUM CARE: ICD-10-CM

## 2019-08-07 DIAGNOSIS — O34.211 ENCOUNTER FOR MATERNAL CARE FOR LOW TRANSVERSE SCAR FROM PREVIOUS CESAREAN DELIVERY: ICD-10-CM

## 2019-08-07 DIAGNOSIS — Z3A.35 35 WEEKS GESTATION OF PREGNANCY: Primary | ICD-10-CM

## 2019-08-07 DIAGNOSIS — O99.322 DRUG USE AFFECTING PREGNANCY IN SECOND TRIMESTER: ICD-10-CM

## 2019-08-07 PROCEDURE — 87653 STREP B DNA AMP PROBE: CPT | Performed by: OBSTETRICS & GYNECOLOGY

## 2019-08-07 PROCEDURE — 99213 OFFICE O/P EST LOW 20 MIN: CPT | Performed by: OBSTETRICS & GYNECOLOGY

## 2019-08-07 PROCEDURE — 90471 IMMUNIZATION ADMIN: CPT | Performed by: OBSTETRICS & GYNECOLOGY

## 2019-08-07 PROCEDURE — 90715 TDAP VACCINE 7 YRS/> IM: CPT | Performed by: OBSTETRICS & GYNECOLOGY

## 2019-08-08 LAB — GROUP B STREP, DNA: NEGATIVE

## 2019-08-09 NOTE — PROGRESS NOTES
CC: LEXII visit    Patient Active Problem List   Diagnosis   • Insufficient antepartum care   • Drug use affecting pregnancy in third trimester   • Encounter for maternal care for low transverse scar from previous  delivery   • Multiparous       HPI: 23 y.o.   GA: 35w6d BEAU: 2019, by Ultrasound  Here for follow up prenatal care.  Patient without complaints today.  Positive fetal movement, denies loss of fluid vaginal bleeding or contractions at this time.  Patient is interested in having tubal ligation, explained to her that paperwork has to be signed 30 days prior to tubal and unfortunately will not likely be able to be done at the time of , however paperwork can be signed today and will be taking care of for tubal ligation at approximately 8 weeks postpartum.  Also advised patient to get Tdap vaccine today.    Labs:   Lab Results   Component Value Date    HGBA1C 5.39 2019     Glucose   Date Value Ref Range Status   2016 90 60 - 100 mg/dl Final   2016 103 (H) 60 - 100 mg/dl Final   2016 72 60 - 100 mg/dl Final     Last Completed Pap Smear       Status Date      PAP SMEAR Done 2016 CONVERTED (HISTORICAL) GYN CYTOLOGY          Radiology:    Each of the above were reviewed and integrated into prenatal care plan.    P/E:  See Vitals flowsheet    A/P: Doing well with appropriately progressing pregnancy at this time.  Size equal to dates.  Tubal papers signed today.  Tdap given today.  Patient to follow-up in 1 week.  Sooner as needed.  Fetal kick count and  labor precautions given.  Repeat  on .     Diagnosis Plan   1. 35 weeks gestation of pregnancy  Group B Strep (Molecular) - Swab, Vaginal/Rectum    Tdap Vaccine Greater Than or Equal To 8yo IM   2. Insufficient antepartum care     3. Drug use affecting pregnancy in third trimester     4. Encounter for maternal care for low transverse scar from previous  delivery

## 2019-08-21 ENCOUNTER — ROUTINE PRENATAL (OUTPATIENT)
Dept: OBSTETRICS AND GYNECOLOGY | Facility: CLINIC | Age: 24
End: 2019-08-21

## 2019-08-21 VITALS — DIASTOLIC BLOOD PRESSURE: 57 MMHG | BODY MASS INDEX: 32.42 KG/M2 | SYSTOLIC BLOOD PRESSURE: 99 MMHG | WEIGHT: 166 LBS

## 2019-08-21 DIAGNOSIS — O99.322 DRUG USE AFFECTING PREGNANCY IN SECOND TRIMESTER: ICD-10-CM

## 2019-08-21 DIAGNOSIS — O09.30 INSUFFICIENT ANTEPARTUM CARE: Primary | ICD-10-CM

## 2019-08-21 DIAGNOSIS — Z64.1 MULTIPAROUS: ICD-10-CM

## 2019-08-21 DIAGNOSIS — Z3A.37 37 WEEKS GESTATION OF PREGNANCY: ICD-10-CM

## 2019-08-21 PROCEDURE — 99213 OFFICE O/P EST LOW 20 MIN: CPT | Performed by: OBSTETRICS & GYNECOLOGY

## 2019-08-21 NOTE — PROGRESS NOTES
Obstetric History and Physical    Complaint: Routine pregnancy visit.        Patient is a 23 y.o. female  currently at 37w6d, who presents for routine pregnancy visit.  Positive fetal movement, denies loss of fluid, vaginal bleeding, is having some irregular contractions.  No other concerns or complaints at this time.  Patient is scheduled for repeat  section on .    The risks, benefits. and alternatives to  section were discussed.  The risks that were discussed included, but were not limited to, pain, infection, bleeding, hemorrhage,  injury to the bowel, bladder, uterus, tubes, ovaries, or baby which could require further surgery or prolonged hospitalization.  Remote possibility of hysterectomy and/or salpingo-ophorectomy. Remote possibility of death of baby and/or mother. I discussed the risk of bleeding with the patient and possible risk of blood transfusion.  Blood products carry risk of HIV, infection, hepatitis, and transfusion reaction. Patient is willing to accept blood products. The patient understands that SCD's will be placed on her legs to try and reduce the risk of clot formation in her legs.  She understands that we will have early ambulation to also reduce the risk of blood clot formation and to reduce the risk of pneumonia.  She will be given antibiotics prior to her surgery to help decrease the risk for infection.  All questions were answered. Patient express understanding and desires to proceed with surgery.    Her prenatal care is is complicated by sporadic prenatal care.     Obstetric History   #: 1, Date: 11, Sex: Male, Weight: 3317 g (7 lb 5 oz), GA: 41w0d, Delivery: , Low Transverse, Apgar1: None, Apgar5: None, Living: Living, Birth Comments: None    #: 2, Date: 17, Sex: Female, Weight: 3260 g (7 lb 3 oz), GA: 39w1d, Delivery: , Low Transverse, Apgar1: 9, Apgar5: 9, Living: Living, Birth Comments: None    #: 3, Date: None, Sex:  None, Weight: None, GA: None, Delivery: None, Apgar1: None, Apgar5: None, Living: None, Birth Comments: None       The following portions of the patients history were reviewed and updated as appropriate: current medications, allergies, past medical history, past surgical history, past family history, past social history and problem list .       Prenatal Information:  Prenatal Results     Initial Prenatal Labs     Test Value Reference Range Date Time    Hemoglobin        Hematocrit        Platelets 239 10*3/mm3 140 - 450 10*3/mm3 07/22/19 1551    Rubella IgG Positive   05/02/19 1137    Hepatitis B SAg Non-Reactive  Non-Reactive 05/02/19 1137    Hepatitis C Ab Non-Reactive  Non-Reactive 05/02/19 1137    RPR Non-Reactive  Non-Reactive 05/02/19 1137    ABO O   05/02/19 1137    Rh Positive   05/02/19 1137    Antibody Screen        HIV Non-Reactive  Non-Reactive 05/02/19 1137    Urine Culture 25,000 CFU/mL Mixed Jacki Isolated   05/02/19 1137    Gonorrhea Negative  Negative 05/02/19 1137    Chlamydia Negative  Negative 05/02/19 1137    TSH              2nd and 3rd Trimester     Test Value Reference Range Date Time    Hemoglobin (repeated) 10.1 g/dL 12.0 - 15.9 g/dL 07/22/19 1551    Hematocrit (repeated) 31.3 % 34.0 - 46.6 % 07/22/19 1551    GCT        Antibody Screen (repeated) Negative   05/02/19 1137    GTT Fasting        GTT 1 Hr        GTT 2 Hr        GTT 3 Hr        Group B Strep Negative  Negative 08/07/19 1511          Drug Screening     Test Value Reference Range Date Time    Amphetamine Screen        Barbiturate Screen Negative  Negative 07/22/19 1551    Benzodiazepine Screen Negative  Negative 07/22/19 1551    Methadone Screen Negative  Negative 07/22/19 1551    Phencyclidine Screen        Opiates Screen Negative  Negative 07/22/19 1551    THC Screen Positive  Negative 07/22/19 1551    Cocaine Screen Negative  Negative 07/22/19 1551    Propoxyphene Screen        Buprenorphine Screen        Methamphetamine Screen         Oxycodone Screen Negative  Negative 07/22/19 1551    Tricyclic Antidepressants Screen              Other (Risk screening)     Test Value Reference Range Date Time    Varicella IgG        Parvovirus IgG        CMV IgG        Cystic Fibrosis        Hemoglobin electrophoresis        NIPT        MSAFP-4        AFP (for NTD only)                  External Prenatal Results     Pregnancy Outside Results - Transcribed From Office Records - See Scanned Records For Details     Test Value Date Time    Hgb 10.1 g/dL 07/22/19 1551    Hct 31.3 % 07/22/19 1551    ABO O  05/02/19 1137    Rh Positive  05/02/19 1137    Antibody Screen Negative  05/02/19 1137    Glucose Fasting GTT       Glucose Tolerance Test 1 hour       Glucose Tolerance Test 3 hour       Gonorrhea (discrete) Negative  05/02/19 1137    Chlamydia (discrete) Negative  05/02/19 1137    RPR Non-Reactive  05/02/19 1137    VDRL       Syphilis Antibody       Rubella Positive  05/02/19 1137    HBsAg Non-Reactive  05/02/19 1137    Herpes Simplex Virus PCR       Herpes Simplex VIrus Culture       HIV Non-Reactive  05/02/19 1137    Hep C RNA Quant PCR       Hep C Antibody Non-Reactive  05/02/19 1137    AFP       Group B Strep Negative  08/07/19 1511    GBS Susceptibility to Clindamycin       GBS Susceptibility to Erythromycin       Fetal Fibronectin       Genetic Testing, Maternal Blood             Drug Screening     Test Value Date Time    Urine Drug Screen       Amphetamine Screen Negative  02/06/17 0647    Barbiturate Screen Negative  07/22/19 1551    Benzodiazepine Screen Negative  07/22/19 1551    Methadone Screen Negative  07/22/19 1551    Phencyclidine Screen       Opiates Screen Negative  07/22/19 1551    THC Screen Positive  07/22/19 1551    Cocaine Screen       Propoxyphene Screen       Buprenorphine Screen       Methamphetamine Screen       Oxycodone Screen Negative  07/22/19 1551    Tricyclic Antidepressants Screen                    Past OB  History:     Obstetric History       T2      L2     SAB0   TAB0   Ectopic0   Molar0   Multiple0   Live Births2       # Outcome Date GA Lbr Patel/2nd Weight Sex Delivery Anes PTL Lv   3 Current            2 Term 17 39w1d  3260 g (7 lb 3 oz) F CS-LTranv Spinal N JOSE DANIEL      Name: IGNACIO APODACA      Apgar1:  9                Apgar5: 9   1 Term 11 41w0d  3317 g (7 lb 5 oz) M CS-LTranv Spinal N JOSE DANIEL      Complications: Failure to Progress in First Stage,Failed induction of labor           ALLERGIES:   No Known Allergies     Home Medications:     Prior to Admission medications    Medication Sig Start Date End Date Taking? Authorizing Provider   ferrous sulfate 325 (65 FE) MG tablet Take 1 tablet by mouth Daily With Breakfast. 19  Yes Keeley Bang APRN   Prenatal Vit-Fe Fumarate-FA (PRENATAL VITAMIN 27-0.8) 27-0.8 MG tablet tablet Take  by mouth Daily.   Yes Provider, MD Radha       Past Medical History: Past Medical History:   Diagnosis Date   • Abdominal pain    • Acute bronchitis    • Acute gastroenteritis    • Acute tonsillitis    • Allergic rhinitis    • Anemia    • Common cold    • Cough    • Dysuria    • Eczema    • Encounter for general counseling on prescription of oral contraceptives    • Fever    • Follow-up exam after treatment     encounter for follow-up exam after completed treatment for conditions other than malignant neoplasm   • Headache    • Health maintenance examination     individual health exam   • Helminth infection     possible roundworm   • History of chicken pox    • Infection of skin     localized left ear   • Infectious gastroenteritis    • Marijuana abuse in remission 2019   • Right lower quadrant pain    • Streptococcal sore throat    • Surveillance of implantable subdermal contraceptive     .   • Upper respiratory infection    • Vaginal discharge    • Varicella       Past Surgical History Past Surgical History:   Procedure Laterality  Date   •  SECTION N/A 2017    Procedure:  SECTION REPEAT;  Surgeon: Homero Humphreys MD;  Location: [facility] LABOR DELIVERY;  Service:    •  SECTION PRIMARY  2011   • INJECTION OF MEDICATION  2013    toradol   • WISDOM TOOTH EXTRACTION        Family History: Family History   Problem Relation Age of Onset   • Cholelithiasis Other    • Ulcers Other    • Cancer Other    • Diabetes Other    • Heart disease Other    • Hypertension Other    • Thyroid disease Other         disorder   • No Known Problems Father    • Anemia Mother    • No Known Problems Brother    • No Known Problems Sister    • No Known Problems Son    • No Known Problems Daughter    • Breast cancer Maternal Grandmother    • Thyroid disease Maternal Grandmother    • No Known Problems Brother    • No Known Problems Brother    • No Known Problems Brother    • Autism Brother    • No Known Problems Sister       Social History:  reports that she has quit smoking. Her smoking use included cigarettes. She has a 0.50 pack-year smoking history. She has never used smokeless tobacco.   reports that she drinks alcohol.   reports that she uses drugs. Drug: Marijuana.        Review of Systems                                                                                                                  Neuro no history of brain tumor    HENT no history of ear tumors    Eye no history of retinal tumors    Pulmonary no history of lung tumors    Cardiac no history of cardiac tumors    GI: No history of small bowel tumors    Musculoskeletal: No history of skeletal muscle tumors    Endocrine: No history of adrenal tumors    Lymphatic: No history of Hodgkin's disease    Renal: No history of renal cancer      Objective     Documented Vitals    19 1306   BP: 99/57   Weight: 75.3 kg (166 lb)       OBGyn Exam  Constitutional: Appears to be in no acute distress; Eyes: sclera normal; Endocrine system: thyroid palpate is normal;  Pulmonary system: lungs clear; Cardiovascular system: heart regular rate and rhythm; Gastrointestinal system: abdomen soft nontender, active bowel sounds; Urologic system: CVA negative; Psychiatric: appropriate insight; Neurologic: gait within normal limits.  Cervix /3.      Last Labs  Lab Results   Component Value Date    WBC 12.53 (H) 2019    RBC 3.87 2019    HGB 10.1 (L) 2019    HCT 31.3 (L) 2019    MCV 80.9 2019    MCH 26.1 (L) 2019    MCHC 32.3 2019    RDW 13.2 2019    RDWSD 38.4 2019    MPV 13.1 (H) 2019     2019        Lab Results   Component Value Date    GLUCOSE 90 2016    BUN 2 (L) 2016    CREATININE 0.6 2016     2016    K 3.6 2016     2016    CO2 22 2016    CALCIUM 8.8 2016    PROTEINTOT 7.7 2016    ALBUMIN 4.4 2016    ALT 34 2016    AST 27 2016    ALKPHOS 72 2016    BILITOT 2.0 (H) 2016    ANIONGAP 10.0 2016       No results found for: HCGQUAL      Assessment/Plan:  1. 23 y.o. [id number].  Doing well with appropriately progressing pregnancy at this time.  Fetal kick count and labor precautions given.  Patient to return on  to labor and delivery for scheduled .  Patient to return sooner as needed.                 This document has been electronically signed by Tee Cervantes DO on 2019 1:22 PM

## 2019-08-29 ENCOUNTER — ANESTHESIA (OUTPATIENT)
Dept: LABOR AND DELIVERY | Facility: HOSPITAL | Age: 24
End: 2019-08-29

## 2019-08-29 ENCOUNTER — ANESTHESIA EVENT (OUTPATIENT)
Dept: LABOR AND DELIVERY | Facility: HOSPITAL | Age: 24
End: 2019-08-29

## 2019-08-29 ENCOUNTER — HOSPITAL ENCOUNTER (INPATIENT)
Facility: HOSPITAL | Age: 24
LOS: 2 days | Discharge: HOME | End: 2019-08-31
Attending: OBSTETRICS & GYNECOLOGY | Admitting: OBSTETRICS & GYNECOLOGY

## 2019-08-29 PROBLEM — Z34.90 NORMAL PREGNANCY: Status: ACTIVE | Noted: 2019-08-29

## 2019-08-29 PROBLEM — Z34.90 NORMAL PREGNANCY: Status: RESOLVED | Noted: 2019-08-29 | Resolved: 2019-08-29

## 2019-08-29 LAB
ABO GROUP BLD: NORMAL
AMPHET+METHAMPHET UR QL: NEGATIVE
AMPHETAMINES UR QL: NEGATIVE
BARBITURATES UR QL SCN: NEGATIVE
BASOPHILS # BLD AUTO: 0.05 10*3/MM3 (ref 0–0.2)
BASOPHILS NFR BLD AUTO: 0.4 % (ref 0–1.5)
BENZODIAZ UR QL SCN: NEGATIVE
BLD GP AB SCN SERPL QL: NEGATIVE
BUPRENORPHINE SERPL-MCNC: NEGATIVE NG/ML
CANNABINOIDS SERPL QL: NEGATIVE
COCAINE UR QL: NEGATIVE
DEPRECATED RDW RBC AUTO: 39.3 FL (ref 37–54)
EOSINOPHIL # BLD AUTO: 0.13 10*3/MM3 (ref 0–0.4)
EOSINOPHIL NFR BLD AUTO: 1.2 % (ref 0.3–6.2)
ERYTHROCYTE [DISTWIDTH] IN BLOOD BY AUTOMATED COUNT: 13.9 % (ref 12.3–15.4)
HCT VFR BLD AUTO: 32.3 % (ref 34–46.6)
HGB BLD-MCNC: 10.4 G/DL (ref 12–15.9)
IMM GRANULOCYTES # BLD AUTO: 0.06 10*3/MM3 (ref 0–0.05)
IMM GRANULOCYTES NFR BLD AUTO: 0.5 % (ref 0–0.5)
LYMPHOCYTES # BLD AUTO: 2.15 10*3/MM3 (ref 0.7–3.1)
LYMPHOCYTES NFR BLD AUTO: 19.2 % (ref 19.6–45.3)
Lab: NORMAL
MCH RBC QN AUTO: 25.3 PG (ref 26.6–33)
MCHC RBC AUTO-ENTMCNC: 32.2 G/DL (ref 31.5–35.7)
MCV RBC AUTO: 78.6 FL (ref 79–97)
METHADONE UR QL SCN: NEGATIVE
MONOCYTES # BLD AUTO: 0.98 10*3/MM3 (ref 0.1–0.9)
MONOCYTES NFR BLD AUTO: 8.7 % (ref 5–12)
NEUTROPHILS # BLD AUTO: 7.84 10*3/MM3 (ref 1.7–7)
NEUTROPHILS NFR BLD AUTO: 70 % (ref 42.7–76)
NRBC BLD AUTO-RTO: 0 /100 WBC (ref 0–0.2)
OPIATES UR QL: NEGATIVE
OXYCODONE UR QL SCN: NEGATIVE
PCP UR QL SCN: NEGATIVE
PLATELET # BLD AUTO: 258 10*3/MM3 (ref 140–450)
PMV BLD AUTO: 11.8 FL (ref 6–12)
PROPOXYPH UR QL: NEGATIVE
RBC # BLD AUTO: 4.11 10*6/MM3 (ref 3.77–5.28)
RH BLD: POSITIVE
T&S EXPIRATION DATE: NORMAL
TRICYCLICS UR QL SCN: NEGATIVE
WBC NRBC COR # BLD: 11.21 10*3/MM3 (ref 3.4–10.8)

## 2019-08-29 PROCEDURE — 59515 CESAREAN DELIVERY: CPT | Performed by: OBSTETRICS & GYNECOLOGY

## 2019-08-29 PROCEDURE — 25010000002 KETOROLAC TROMETHAMINE PER 15 MG

## 2019-08-29 PROCEDURE — 94760 N-INVAS EAR/PLS OXIMETRY 1: CPT

## 2019-08-29 PROCEDURE — 25010000002 ONDANSETRON PER 1 MG

## 2019-08-29 PROCEDURE — 86901 BLOOD TYPING SEROLOGIC RH(D): CPT | Performed by: OBSTETRICS & GYNECOLOGY

## 2019-08-29 PROCEDURE — 86900 BLOOD TYPING SEROLOGIC ABO: CPT | Performed by: OBSTETRICS & GYNECOLOGY

## 2019-08-29 PROCEDURE — 25010000002 ONDANSETRON PER 1 MG: Performed by: NURSE ANESTHETIST, CERTIFIED REGISTERED

## 2019-08-29 PROCEDURE — 25010000002 PHENYLEPHRINE 10 MG/ML SOLUTION: Performed by: NURSE ANESTHETIST, CERTIFIED REGISTERED

## 2019-08-29 PROCEDURE — 94799 UNLISTED PULMONARY SVC/PX: CPT

## 2019-08-29 PROCEDURE — 51703 INSERT BLADDER CATH COMPLEX: CPT

## 2019-08-29 PROCEDURE — 25010000003 CEFAZOLIN PER 500 MG: Performed by: STUDENT IN AN ORGANIZED HEALTH CARE EDUCATION/TRAINING PROGRAM

## 2019-08-29 PROCEDURE — 25010000002 MORPHINE PER 10 MG: Performed by: NURSE ANESTHETIST, CERTIFIED REGISTERED

## 2019-08-29 PROCEDURE — 80306 DRUG TEST PRSMV INSTRMNT: CPT | Performed by: OBSTETRICS & GYNECOLOGY

## 2019-08-29 PROCEDURE — 85025 COMPLETE CBC W/AUTO DIFF WBC: CPT | Performed by: OBSTETRICS & GYNECOLOGY

## 2019-08-29 PROCEDURE — 25010000002 KETOROLAC TROMETHAMINE PER 15 MG: Performed by: NURSE ANESTHETIST, CERTIFIED REGISTERED

## 2019-08-29 PROCEDURE — 86850 RBC ANTIBODY SCREEN: CPT | Performed by: OBSTETRICS & GYNECOLOGY

## 2019-08-29 RX ORDER — TRISODIUM CITRATE DIHYDRATE AND CITRIC ACID MONOHYDRATE 500; 334 MG/5ML; MG/5ML
30 SOLUTION ORAL ONCE
Status: COMPLETED | OUTPATIENT
Start: 2019-08-29 | End: 2019-08-29

## 2019-08-29 RX ORDER — OXYTOCIN/0.9 % SODIUM CHLORIDE 30/500 ML
650 PLASTIC BAG, INJECTION (ML) INTRAVENOUS ONCE
Status: DISCONTINUED | OUTPATIENT
Start: 2019-08-29 | End: 2019-08-30

## 2019-08-29 RX ORDER — BISACODYL 5 MG/1
10 TABLET, DELAYED RELEASE ORAL DAILY PRN
Status: DISCONTINUED | OUTPATIENT
Start: 2019-08-29 | End: 2019-08-31 | Stop reason: HOSPADM

## 2019-08-29 RX ORDER — OXYTOCIN/0.9 % SODIUM CHLORIDE 30/500 ML
650 PLASTIC BAG, INJECTION (ML) INTRAVENOUS ONCE
Status: DISCONTINUED | OUTPATIENT
Start: 2019-08-29 | End: 2019-08-31 | Stop reason: HOSPADM

## 2019-08-29 RX ORDER — OXYTOCIN/0.9 % SODIUM CHLORIDE 30/500 ML
85 PLASTIC BAG, INJECTION (ML) INTRAVENOUS ONCE
Status: DISCONTINUED | OUTPATIENT
Start: 2019-08-29 | End: 2019-08-31 | Stop reason: HOSPADM

## 2019-08-29 RX ORDER — SODIUM CHLORIDE, SODIUM LACTATE, POTASSIUM CHLORIDE, CALCIUM CHLORIDE 600; 310; 30; 20 MG/100ML; MG/100ML; MG/100ML; MG/100ML
150 INJECTION, SOLUTION INTRAVENOUS CONTINUOUS
Status: DISCONTINUED | OUTPATIENT
Start: 2019-08-29 | End: 2019-08-30

## 2019-08-29 RX ORDER — DIPHENHYDRAMINE HYDROCHLORIDE 50 MG/ML
25 INJECTION INTRAMUSCULAR; INTRAVENOUS EVERY 4 HOURS PRN
Status: DISCONTINUED | OUTPATIENT
Start: 2019-08-29 | End: 2019-08-30

## 2019-08-29 RX ORDER — DOCUSATE SODIUM 100 MG/1
100 CAPSULE, LIQUID FILLED ORAL 2 TIMES DAILY
Status: DISCONTINUED | OUTPATIENT
Start: 2019-08-29 | End: 2019-08-31 | Stop reason: HOSPADM

## 2019-08-29 RX ORDER — SODIUM CHLORIDE 0.9 % (FLUSH) 0.9 %
3-10 SYRINGE (ML) INJECTION AS NEEDED
Status: DISCONTINUED | OUTPATIENT
Start: 2019-08-29 | End: 2019-08-29 | Stop reason: HOSPADM

## 2019-08-29 RX ORDER — LIDOCAINE HYDROCHLORIDE 10 MG/ML
5 INJECTION, SOLUTION EPIDURAL; INFILTRATION; INTRACAUDAL; PERINEURAL AS NEEDED
Status: DISCONTINUED | OUTPATIENT
Start: 2019-08-29 | End: 2019-08-29 | Stop reason: HOSPADM

## 2019-08-29 RX ORDER — SODIUM CHLORIDE, SODIUM LACTATE, POTASSIUM CHLORIDE, CALCIUM CHLORIDE 600; 310; 30; 20 MG/100ML; MG/100ML; MG/100ML; MG/100ML
INJECTION, SOLUTION INTRAVENOUS
Status: COMPLETED
Start: 2019-08-29 | End: 2019-08-29

## 2019-08-29 RX ORDER — PROMETHAZINE HYDROCHLORIDE 12.5 MG/1
12.5 SUPPOSITORY RECTAL EVERY 6 HOURS PRN
Status: DISCONTINUED | OUTPATIENT
Start: 2019-08-29 | End: 2019-08-29 | Stop reason: HOSPADM

## 2019-08-29 RX ORDER — DIPHENHYDRAMINE HCL 25 MG
25 CAPSULE ORAL EVERY 4 HOURS PRN
Status: DISCONTINUED | OUTPATIENT
Start: 2019-08-29 | End: 2019-08-29 | Stop reason: SDUPTHER

## 2019-08-29 RX ORDER — KETOROLAC TROMETHAMINE 15 MG/ML
30 INJECTION, SOLUTION INTRAMUSCULAR; INTRAVENOUS EVERY 6 HOURS
Status: COMPLETED | OUTPATIENT
Start: 2019-08-29 | End: 2019-08-30

## 2019-08-29 RX ORDER — PHENYLEPHRINE HYDROCHLORIDE 10 MG/ML
INJECTION INTRAVENOUS AS NEEDED
Status: DISCONTINUED | OUTPATIENT
Start: 2019-08-29 | End: 2019-08-29 | Stop reason: SURG

## 2019-08-29 RX ORDER — BUPIVACAINE HCL/0.9 % NACL/PF 0.1 %
2 PLASTIC BAG, INJECTION (ML) EPIDURAL EVERY 8 HOURS
Status: COMPLETED | OUTPATIENT
Start: 2019-08-29 | End: 2019-08-30

## 2019-08-29 RX ORDER — LANOLIN 100 %
OINTMENT (GRAM) TOPICAL
Status: DISCONTINUED | OUTPATIENT
Start: 2019-08-29 | End: 2019-08-31 | Stop reason: HOSPADM

## 2019-08-29 RX ORDER — ONDANSETRON 2 MG/ML
INJECTION INTRAMUSCULAR; INTRAVENOUS
Status: COMPLETED
Start: 2019-08-29 | End: 2019-08-29

## 2019-08-29 RX ORDER — OXYTOCIN/0.9 % SODIUM CHLORIDE 30/500 ML
PLASTIC BAG, INJECTION (ML) INTRAVENOUS
Status: DISPENSED
Start: 2019-08-29 | End: 2019-08-30

## 2019-08-29 RX ORDER — SODIUM CHLORIDE 0.9 % (FLUSH) 0.9 %
3 SYRINGE (ML) INJECTION EVERY 12 HOURS SCHEDULED
Status: DISCONTINUED | OUTPATIENT
Start: 2019-08-29 | End: 2019-08-29 | Stop reason: HOSPADM

## 2019-08-29 RX ORDER — DIPHENHYDRAMINE HCL 25 MG
25 CAPSULE ORAL EVERY 4 HOURS PRN
Status: DISCONTINUED | OUTPATIENT
Start: 2019-08-29 | End: 2019-08-30

## 2019-08-29 RX ORDER — ALUMINA, MAGNESIA, AND SIMETHICONE 2400; 2400; 240 MG/30ML; MG/30ML; MG/30ML
15 SUSPENSION ORAL EVERY 4 HOURS PRN
Status: DISCONTINUED | OUTPATIENT
Start: 2019-08-29 | End: 2019-08-31 | Stop reason: HOSPADM

## 2019-08-29 RX ORDER — ACETAMINOPHEN 500 MG
1000 TABLET ORAL EVERY 6 HOURS
Status: COMPLETED | OUTPATIENT
Start: 2019-08-29 | End: 2019-08-30

## 2019-08-29 RX ORDER — MORPHINE SULFATE 1 MG/ML
INJECTION, SOLUTION EPIDURAL; INTRATHECAL; INTRAVENOUS AS NEEDED
Status: DISCONTINUED | OUTPATIENT
Start: 2019-08-29 | End: 2019-08-29 | Stop reason: SURG

## 2019-08-29 RX ORDER — MISOPROSTOL 200 UG/1
800 TABLET ORAL AS NEEDED
Status: DISCONTINUED | OUTPATIENT
Start: 2019-08-29 | End: 2019-08-29 | Stop reason: HOSPADM

## 2019-08-29 RX ORDER — ONDANSETRON 2 MG/ML
4 INJECTION INTRAMUSCULAR; INTRAVENOUS EVERY 6 HOURS PRN
Status: DISCONTINUED | OUTPATIENT
Start: 2019-08-29 | End: 2019-08-29 | Stop reason: HOSPADM

## 2019-08-29 RX ORDER — PROMETHAZINE HYDROCHLORIDE 25 MG/ML
12.5 INJECTION, SOLUTION INTRAMUSCULAR; INTRAVENOUS EVERY 4 HOURS PRN
Status: DISCONTINUED | OUTPATIENT
Start: 2019-08-29 | End: 2019-08-31 | Stop reason: HOSPADM

## 2019-08-29 RX ORDER — BUPIVACAINE HCL/0.9 % NACL/PF 0.1 %
2 PLASTIC BAG, INJECTION (ML) EPIDURAL ONCE
Status: DISCONTINUED | OUTPATIENT
Start: 2019-08-29 | End: 2019-08-29 | Stop reason: HOSPADM

## 2019-08-29 RX ORDER — PROMETHAZINE HYDROCHLORIDE 12.5 MG/1
12.5 TABLET ORAL EVERY 6 HOURS PRN
Status: DISCONTINUED | OUTPATIENT
Start: 2019-08-29 | End: 2019-08-29 | Stop reason: HOSPADM

## 2019-08-29 RX ORDER — ONDANSETRON 2 MG/ML
4 INJECTION INTRAMUSCULAR; INTRAVENOUS EVERY 6 HOURS PRN
Status: DISCONTINUED | OUTPATIENT
Start: 2019-08-29 | End: 2019-08-29 | Stop reason: SDUPTHER

## 2019-08-29 RX ORDER — METHYLERGONOVINE MALEATE 0.2 MG/ML
200 INJECTION INTRAVENOUS ONCE AS NEEDED
Status: DISCONTINUED | OUTPATIENT
Start: 2019-08-29 | End: 2019-08-29 | Stop reason: HOSPADM

## 2019-08-29 RX ORDER — ONDANSETRON 2 MG/ML
INJECTION INTRAMUSCULAR; INTRAVENOUS AS NEEDED
Status: DISCONTINUED | OUTPATIENT
Start: 2019-08-29 | End: 2019-08-29 | Stop reason: SURG

## 2019-08-29 RX ORDER — SIMETHICONE 80 MG
80 TABLET,CHEWABLE ORAL 4 TIMES DAILY PRN
Status: DISCONTINUED | OUTPATIENT
Start: 2019-08-29 | End: 2019-08-31 | Stop reason: HOSPADM

## 2019-08-29 RX ORDER — PROMETHAZINE HYDROCHLORIDE 25 MG/ML
6.25 INJECTION, SOLUTION INTRAMUSCULAR; INTRAVENOUS
Status: DISCONTINUED | OUTPATIENT
Start: 2019-08-29 | End: 2019-08-29 | Stop reason: HOSPADM

## 2019-08-29 RX ORDER — OXYTOCIN/0.9 % SODIUM CHLORIDE 30/500 ML
85 PLASTIC BAG, INJECTION (ML) INTRAVENOUS ONCE
Status: COMPLETED | OUTPATIENT
Start: 2019-08-29 | End: 2019-08-29

## 2019-08-29 RX ORDER — ACETAMINOPHEN 500 MG
TABLET ORAL
Status: COMPLETED
Start: 2019-08-29 | End: 2019-08-29

## 2019-08-29 RX ORDER — TRISODIUM CITRATE DIHYDRATE AND CITRIC ACID MONOHYDRATE 500; 334 MG/5ML; MG/5ML
SOLUTION ORAL
Status: COMPLETED
Start: 2019-08-29 | End: 2019-08-29

## 2019-08-29 RX ORDER — BUPIVACAINE HYDROCHLORIDE 7.5 MG/ML
INJECTION, SOLUTION EPIDURAL; RETROBULBAR
Status: COMPLETED | OUTPATIENT
Start: 2019-08-29 | End: 2019-08-29

## 2019-08-29 RX ORDER — NALOXONE HCL 0.4 MG/ML
0.2 VIAL (ML) INJECTION
Status: DISCONTINUED | OUTPATIENT
Start: 2019-08-29 | End: 2019-08-30

## 2019-08-29 RX ORDER — ONDANSETRON 4 MG/1
4 TABLET, FILM COATED ORAL EVERY 6 HOURS PRN
Status: DISCONTINUED | OUTPATIENT
Start: 2019-08-29 | End: 2019-08-29 | Stop reason: HOSPADM

## 2019-08-29 RX ORDER — KETOROLAC TROMETHAMINE 30 MG/ML
INJECTION, SOLUTION INTRAMUSCULAR; INTRAVENOUS AS NEEDED
Status: DISCONTINUED | OUTPATIENT
Start: 2019-08-29 | End: 2019-08-29 | Stop reason: SURG

## 2019-08-29 RX ORDER — MISOPROSTOL 200 UG/1
600 TABLET ORAL ONCE
Status: DISCONTINUED | OUTPATIENT
Start: 2019-08-29 | End: 2019-08-30

## 2019-08-29 RX ORDER — CARBOPROST TROMETHAMINE 250 UG/ML
250 INJECTION, SOLUTION INTRAMUSCULAR AS NEEDED
Status: DISCONTINUED | OUTPATIENT
Start: 2019-08-29 | End: 2019-08-29 | Stop reason: HOSPADM

## 2019-08-29 RX ORDER — PRENATAL VIT/IRON FUM/FOLIC AC 27MG-0.8MG
1 TABLET ORAL DAILY
Status: DISCONTINUED | OUTPATIENT
Start: 2019-08-29 | End: 2019-08-31 | Stop reason: HOSPADM

## 2019-08-29 RX ORDER — OXYTOCIN/0.9 % SODIUM CHLORIDE 30/500 ML
PLASTIC BAG, INJECTION (ML) INTRAVENOUS
Status: COMPLETED
Start: 2019-08-29 | End: 2019-08-29

## 2019-08-29 RX ORDER — KETOROLAC TROMETHAMINE 15 MG/ML
INJECTION, SOLUTION INTRAMUSCULAR; INTRAVENOUS
Status: COMPLETED
Start: 2019-08-29 | End: 2019-08-29

## 2019-08-29 RX ADMIN — EPHEDRINE SULFATE 5 MG: 50 INJECTION INTRAMUSCULAR; INTRAVENOUS; SUBCUTANEOUS at 12:27

## 2019-08-29 RX ADMIN — KETOROLAC TROMETHAMINE 30 MG: 15 INJECTION, SOLUTION INTRAMUSCULAR; INTRAVENOUS at 18:37

## 2019-08-29 RX ADMIN — BUPIVACAINE HYDROCHLORIDE 1.4 ML: 7.5 INJECTION, SOLUTION EPIDURAL; RETROBULBAR at 12:18

## 2019-08-29 RX ADMIN — PHENYLEPHRINE HYDROCHLORIDE 100 MCG: 10 INJECTION INTRAVENOUS at 12:55

## 2019-08-29 RX ADMIN — ACETAMINOPHEN 1000 MG: 500 TABLET ORAL at 15:09

## 2019-08-29 RX ADMIN — SODIUM CHLORIDE, POTASSIUM CHLORIDE, SODIUM LACTATE AND CALCIUM CHLORIDE 150 ML/HR: 600; 310; 30; 20 INJECTION, SOLUTION INTRAVENOUS at 09:45

## 2019-08-29 RX ADMIN — ONDANSETRON 4 MG: 2 INJECTION INTRAMUSCULAR; INTRAVENOUS at 15:34

## 2019-08-29 RX ADMIN — EPHEDRINE SULFATE 5 MG: 50 INJECTION INTRAMUSCULAR; INTRAVENOUS; SUBCUTANEOUS at 12:32

## 2019-08-29 RX ADMIN — OXYTOCIN-SODIUM CHLORIDE 0.9% IV SOLN 30 UNIT/500ML 100 ML: 30-0.9/5 SOLUTION at 13:40

## 2019-08-29 RX ADMIN — Medication 0.2 MG: at 12:18

## 2019-08-29 RX ADMIN — TRISODIUM CITRATE DIHYDRATE AND CITRIC ACID MONOHYDRATE 30 ML: 500; 334 SOLUTION ORAL at 11:56

## 2019-08-29 RX ADMIN — Medication 3.8 MG: at 12:50

## 2019-08-29 RX ADMIN — CEFAZOLIN 2 G: 1 INJECTION, POWDER, FOR SOLUTION INTRAMUSCULAR; INTRAVENOUS; PARENTERAL at 18:40

## 2019-08-29 RX ADMIN — SODIUM CHLORIDE, POTASSIUM CHLORIDE, SODIUM LACTATE AND CALCIUM CHLORIDE 150 ML/HR: 600; 310; 30; 20 INJECTION, SOLUTION INTRAVENOUS at 11:58

## 2019-08-29 RX ADMIN — ONDANSETRON 4 MG: 2 INJECTION INTRAMUSCULAR; INTRAVENOUS at 13:00

## 2019-08-29 RX ADMIN — OXYTOCIN-SODIUM CHLORIDE 0.9% IV SOLN 30 UNIT/500ML 100 ML: 30-0.9/5 SOLUTION at 12:50

## 2019-08-29 RX ADMIN — PHENYLEPHRINE HYDROCHLORIDE 200 MCG: 10 INJECTION INTRAVENOUS at 12:22

## 2019-08-29 RX ADMIN — DOCUSATE SODIUM 100 MG: 100 CAPSULE, LIQUID FILLED ORAL at 21:47

## 2019-08-29 RX ADMIN — OXYTOCIN-SODIUM CHLORIDE 0.9% IV SOLN 30 UNIT/500ML 100 ML: 30-0.9/5 SOLUTION at 13:08

## 2019-08-29 RX ADMIN — ACETAMINOPHEN 1000 MG: 500 TABLET ORAL at 21:47

## 2019-08-29 RX ADMIN — OXYTOCIN-SODIUM CHLORIDE 0.9% IV SOLN 30 UNIT/500ML 100 ML: 30-0.9/5 SOLUTION at 13:00

## 2019-08-29 RX ADMIN — SODIUM CITRATE AND CITRIC ACID MONOHYDRATE 30 ML: 500; 334 SOLUTION ORAL at 11:56

## 2019-08-29 RX ADMIN — ONDANSETRON 4 MG: 2 INJECTION INTRAMUSCULAR; INTRAVENOUS at 12:12

## 2019-08-29 RX ADMIN — KETOROLAC TROMETHAMINE 30 MG: 30 INJECTION, SOLUTION INTRAMUSCULAR at 13:10

## 2019-08-29 RX ADMIN — PHENYLEPHRINE HYDROCHLORIDE 200 MCG: 10 INJECTION INTRAVENOUS at 12:17

## 2019-08-29 NOTE — ANESTHESIA PROCEDURE NOTES
Spinal Block      Patient reassessed immediately prior to procedure    Patient location during procedure: OB  Indication:at surgeon's request  Performed By  CRNA: Christoph Kumar SRNA  Preanesthetic Checklist  Completed: patient identified, site marked, surgical consent, pre-op evaluation, timeout performed, IV checked, risks and benefits discussed and monitors and equipment checked  Spinal Block Prep:  Patient Position:sitting  Sterile Tech:cap, gloves, sterile barriers and mask  Prep:Betadine  Patient Monitoring:EKG, continuous pulse oximetry and blood pressure monitoring  Spinal Block Procedure  Approach:midline  Guidance:landmark technique and palpation technique  Location:L3-L4  Needle Type:Pencan  Needle Gauge:24 G  Placement of Spinal needle event:cerebrospinal fluid aspirated  Paresthesia: no  Fluid Appearance:clear  Medications: bupivacaine PF (MARCAINE) 0.75 % injection, 1.4 mL  Med Administered at 8/29/2019 12:18 PM   Post Assessment  Patient Tolerance:patient tolerated the procedure well with no apparent complications  Complications no  Additional Notes  Patient tolerated well. CSF aspirated. 1.4 mls 0.75% bupivacaine with 0.2 mg duramorph

## 2019-08-29 NOTE — ANESTHESIA PREPROCEDURE EVALUATION
Anesthesia Evaluation     Patient summary reviewed and Nursing notes reviewed   NPO Solid Status: > 8 hours  NPO Liquid Status: > 8 hours           Airway   No difficulty expected  Dental      Pulmonary - normal exam   (+) recent URI,   Cardiovascular - negative cardio ROS and normal exam        Neuro/Psych  (+) headaches,     GI/Hepatic/Renal/Endo    (+) obesity,       Musculoskeletal     Abdominal   (+) obese,    Substance History - negative use     OB/GYN    (+) Pregnant,         Other - negative ROS                       Anesthesia Plan    ASA 2 - emergent     spinal     intravenous induction   Anesthetic plan, all risks, benefits, and alternatives have been provided, discussed and informed consent has been obtained with: patient.    Plan discussed with CRNA.

## 2019-08-30 LAB
BASOPHILS # BLD AUTO: 0.05 10*3/MM3 (ref 0–0.2)
BASOPHILS NFR BLD AUTO: 0.4 % (ref 0–1.5)
DEPRECATED RDW RBC AUTO: 39.4 FL (ref 37–54)
EOSINOPHIL # BLD AUTO: 0.14 10*3/MM3 (ref 0–0.4)
EOSINOPHIL NFR BLD AUTO: 1 % (ref 0.3–6.2)
ERYTHROCYTE [DISTWIDTH] IN BLOOD BY AUTOMATED COUNT: 13.8 % (ref 12.3–15.4)
HCT VFR BLD AUTO: 27.1 % (ref 34–46.6)
HGB BLD-MCNC: 8.6 G/DL (ref 12–15.9)
IMM GRANULOCYTES # BLD AUTO: 0.05 10*3/MM3 (ref 0–0.05)
IMM GRANULOCYTES NFR BLD AUTO: 0.4 % (ref 0–0.5)
LYMPHOCYTES # BLD AUTO: 2.13 10*3/MM3 (ref 0.7–3.1)
LYMPHOCYTES NFR BLD AUTO: 15.3 % (ref 19.6–45.3)
MCH RBC QN AUTO: 24.9 PG (ref 26.6–33)
MCHC RBC AUTO-ENTMCNC: 31.7 G/DL (ref 31.5–35.7)
MCV RBC AUTO: 78.6 FL (ref 79–97)
MONOCYTES # BLD AUTO: 0.92 10*3/MM3 (ref 0.1–0.9)
MONOCYTES NFR BLD AUTO: 6.6 % (ref 5–12)
NEUTROPHILS # BLD AUTO: 10.64 10*3/MM3 (ref 1.7–7)
NEUTROPHILS NFR BLD AUTO: 76.3 % (ref 42.7–76)
NRBC BLD AUTO-RTO: 0 /100 WBC (ref 0–0.2)
PLATELET # BLD AUTO: 203 10*3/MM3 (ref 140–450)
PMV BLD AUTO: 11.8 FL (ref 6–12)
RBC # BLD AUTO: 3.45 10*6/MM3 (ref 3.77–5.28)
WBC NRBC COR # BLD: 13.93 10*3/MM3 (ref 3.4–10.8)

## 2019-08-30 PROCEDURE — 25010000003 CEFAZOLIN PER 500 MG: Performed by: STUDENT IN AN ORGANIZED HEALTH CARE EDUCATION/TRAINING PROGRAM

## 2019-08-30 PROCEDURE — 99024 POSTOP FOLLOW-UP VISIT: CPT | Performed by: OBSTETRICS & GYNECOLOGY

## 2019-08-30 PROCEDURE — 25010000002 KETOROLAC TROMETHAMINE PER 15 MG: Performed by: STUDENT IN AN ORGANIZED HEALTH CARE EDUCATION/TRAINING PROGRAM

## 2019-08-30 PROCEDURE — 85025 COMPLETE CBC W/AUTO DIFF WBC: CPT | Performed by: STUDENT IN AN ORGANIZED HEALTH CARE EDUCATION/TRAINING PROGRAM

## 2019-08-30 RX ORDER — OXYCODONE HYDROCHLORIDE 5 MG/1
5 TABLET ORAL EVERY 4 HOURS PRN
Status: DISCONTINUED | OUTPATIENT
Start: 2019-08-30 | End: 2019-08-31 | Stop reason: HOSPADM

## 2019-08-30 RX ORDER — IBUPROFEN 800 MG/1
800 TABLET ORAL EVERY 8 HOURS SCHEDULED
Status: DISCONTINUED | OUTPATIENT
Start: 2019-08-30 | End: 2019-08-31 | Stop reason: HOSPADM

## 2019-08-30 RX ORDER — ACETAMINOPHEN 500 MG
1000 TABLET ORAL EVERY 6 HOURS
Status: DISCONTINUED | OUTPATIENT
Start: 2019-08-30 | End: 2019-08-31 | Stop reason: HOSPADM

## 2019-08-30 RX ORDER — ACETAMINOPHEN 500 MG
1000 TABLET ORAL EVERY 6 HOURS
Status: DISCONTINUED | OUTPATIENT
Start: 2019-08-30 | End: 2019-08-30

## 2019-08-30 RX ORDER — OXYCODONE HYDROCHLORIDE 5 MG/1
10 TABLET ORAL EVERY 4 HOURS PRN
Status: DISCONTINUED | OUTPATIENT
Start: 2019-08-30 | End: 2019-08-31 | Stop reason: HOSPADM

## 2019-08-30 RX ADMIN — DOCUSATE SODIUM 100 MG: 100 CAPSULE, LIQUID FILLED ORAL at 10:29

## 2019-08-30 RX ADMIN — KETOROLAC TROMETHAMINE 30 MG: 15 INJECTION, SOLUTION INTRAMUSCULAR; INTRAVENOUS at 06:17

## 2019-08-30 RX ADMIN — DOCUSATE SODIUM 100 MG: 100 CAPSULE, LIQUID FILLED ORAL at 21:37

## 2019-08-30 RX ADMIN — KETOROLAC TROMETHAMINE 30 MG: 15 INJECTION, SOLUTION INTRAMUSCULAR; INTRAVENOUS at 14:21

## 2019-08-30 RX ADMIN — ACETAMINOPHEN 1000 MG: 500 TABLET ORAL at 19:58

## 2019-08-30 RX ADMIN — ACETAMINOPHEN 1000 MG: 500 TABLET ORAL at 10:28

## 2019-08-30 RX ADMIN — PRENATAL VIT W/ FE FUMARATE-FA TAB 27-0.8 MG 1 TABLET: 27-0.8 TAB at 10:29

## 2019-08-30 RX ADMIN — IBUPROFEN 800 MG: 800 TABLET ORAL at 21:37

## 2019-08-30 RX ADMIN — KETOROLAC TROMETHAMINE 30 MG: 15 INJECTION, SOLUTION INTRAMUSCULAR; INTRAVENOUS at 00:24

## 2019-08-30 RX ADMIN — CEFAZOLIN 2 G: 1 INJECTION, POWDER, FOR SOLUTION INTRAMUSCULAR; INTRAVENOUS; PARENTERAL at 02:55

## 2019-08-30 RX ADMIN — SIMETHICONE CHEW TAB 80 MG 80 MG: 80 TABLET ORAL at 14:22

## 2019-08-30 RX ADMIN — ACETAMINOPHEN 1000 MG: 500 TABLET ORAL at 02:55

## 2019-08-31 VITALS
RESPIRATION RATE: 18 BRPM | WEIGHT: 164 LBS | TEMPERATURE: 98.6 F | HEART RATE: 72 BPM | DIASTOLIC BLOOD PRESSURE: 50 MMHG | SYSTOLIC BLOOD PRESSURE: 107 MMHG | HEIGHT: 61 IN | OXYGEN SATURATION: 98 % | BODY MASS INDEX: 30.96 KG/M2

## 2019-08-31 PROBLEM — D62 ANEMIA DUE TO ACUTE BLOOD LOSS: Status: ACTIVE | Noted: 2019-08-31

## 2019-08-31 PROBLEM — Z64.1 MULTIPAROUS: Status: RESOLVED | Noted: 2019-07-26 | Resolved: 2019-08-31

## 2019-08-31 PROBLEM — O99.323 DRUG USE AFFECTING PREGNANCY IN THIRD TRIMESTER: Status: ACTIVE | Noted: 2019-07-26

## 2019-08-31 PROBLEM — O99.019 MATERNAL ANEMIA IN PREGNANCY, ANTEPARTUM: Status: ACTIVE | Noted: 2019-08-31

## 2019-08-31 PROCEDURE — 99024 POSTOP FOLLOW-UP VISIT: CPT | Performed by: OBSTETRICS & GYNECOLOGY

## 2019-08-31 RX ORDER — IBUPROFEN 800 MG/1
800 TABLET ORAL EVERY 8 HOURS PRN
Qty: 30 TABLET | Refills: 0 | Status: SHIPPED | OUTPATIENT
Start: 2019-08-31 | End: 2020-10-28

## 2019-08-31 RX ORDER — PSEUDOEPHEDRINE HCL 30 MG
100 TABLET ORAL 2 TIMES DAILY
Qty: 30 EACH | Refills: 1 | Status: SHIPPED | OUTPATIENT
Start: 2019-08-31 | End: 2019-09-05

## 2019-08-31 RX ORDER — OXYCODONE HYDROCHLORIDE 5 MG/1
5 TABLET ORAL EVERY 4 HOURS PRN
Qty: 15 TABLET | Refills: 0 | Status: SHIPPED | OUTPATIENT
Start: 2019-08-31 | End: 2019-09-05

## 2019-08-31 RX ORDER — ACETAMINOPHEN 500 MG
1000 TABLET ORAL EVERY 6 HOURS PRN
Qty: 30 TABLET | Refills: 0 | Status: SHIPPED | OUTPATIENT
Start: 2019-08-31 | End: 2019-09-05

## 2019-08-31 RX ADMIN — ACETAMINOPHEN 1000 MG: 500 TABLET ORAL at 07:53

## 2019-08-31 RX ADMIN — OXYCODONE HYDROCHLORIDE 5 MG: 5 TABLET ORAL at 05:31

## 2019-08-31 RX ADMIN — PRENATAL VIT W/ FE FUMARATE-FA TAB 27-0.8 MG 1 TABLET: 27-0.8 TAB at 09:17

## 2019-08-31 RX ADMIN — DOCUSATE SODIUM 100 MG: 100 CAPSULE, LIQUID FILLED ORAL at 09:17

## 2019-09-05 ENCOUNTER — OFFICE VISIT (OUTPATIENT)
Dept: OBSTETRICS AND GYNECOLOGY | Facility: CLINIC | Age: 24
End: 2019-09-05

## 2019-09-05 VITALS
DIASTOLIC BLOOD PRESSURE: 63 MMHG | WEIGHT: 158 LBS | BODY MASS INDEX: 29.83 KG/M2 | SYSTOLIC BLOOD PRESSURE: 111 MMHG | HEIGHT: 61 IN

## 2019-09-05 DIAGNOSIS — Z09 POSTOPERATIVE FOLLOW-UP: Primary | ICD-10-CM

## 2019-09-05 PROBLEM — D62 ANEMIA DUE TO ACUTE BLOOD LOSS: Status: RESOLVED | Noted: 2019-08-31 | Resolved: 2019-08-29

## 2019-09-05 PROBLEM — O99.019 MATERNAL ANEMIA IN PREGNANCY, ANTEPARTUM: Status: RESOLVED | Noted: 2019-08-31 | Resolved: 2019-08-29

## 2019-09-05 PROBLEM — O99.323 DRUG USE AFFECTING PREGNANCY IN THIRD TRIMESTER: Status: RESOLVED | Noted: 2019-07-26 | Resolved: 2019-08-29

## 2019-09-05 PROBLEM — O34.211 ENCOUNTER FOR MATERNAL CARE FOR LOW TRANSVERSE SCAR FROM PREVIOUS CESAREAN DELIVERY: Status: RESOLVED | Noted: 2019-07-26 | Resolved: 2019-08-29

## 2019-09-05 PROBLEM — O09.30 INSUFFICIENT ANTEPARTUM CARE: Status: RESOLVED | Noted: 2019-07-26 | Resolved: 2019-08-29

## 2019-09-05 PROCEDURE — 99024 POSTOP FOLLOW-UP VISIT: CPT | Performed by: OBSTETRICS & GYNECOLOGY

## 2019-09-05 NOTE — PROGRESS NOTES
"Chief complaint: Postop and postpartum follow-up.    SUBJECTIVE:   Pt is a 23 y.o.  now s/p repeat low transverse  section 7 days ago. Pt denies fever, abdominal pain, n/v/d/c, VB, Pt currently breast feeding and taking PNV, ambulating without difficulty, urinating and stooling without complaints and tolerating normal diet.     OBJECTIVE:  /63   Ht 154.9 cm (61\")   Wt 71.7 kg (158 lb)   BMI 29.85 kg/m²     Review of Systems   Constitutional: Negative for chills, fatigue and fever.   HENT: Negative for sore throat.    Eyes: Negative for visual disturbance.   Respiratory: Negative for cough, shortness of breath and wheezing.    Cardiovascular: Negative for chest pain, palpitations and leg swelling.   Gastrointestinal: Negative for abdominal pain, diarrhea, nausea and vomiting.   Genitourinary: Negative for dysuria, flank pain, frequency, vaginal bleeding, vaginal discharge and vaginal pain.   Neurological: Negative for syncope, light-headedness and headaches.   Psychiatric/Behavioral: Negative for dysphoric mood and suicidal ideas. The patient is not nervous/anxious.        Physical Exam   Constitutional: She is oriented to person, place, and time. She appears well-developed and well-nourished. No distress.   Abdominal: Soft. She exhibits no distension. There is no tenderness. There is no guarding.   Properly healing Pfannenstiel incision at this time.   Musculoskeletal: Normal range of motion.   Neurological: She is alert and oriented to person, place, and time.   Skin: Skin is warm and dry. She is not diaphoretic.   Psychiatric: She has a normal mood and affect. Her behavior is normal. Judgment and thought content normal.       ASSESSMENT:    Pt is a 23 y.o.  s/p repeat low transverse  section, doing well and healing appropriately at this time.  PLAN:   1.  Patient to schedule tubal ligation at next postop and postpartum visit.  2. Pt to continue to increase exercise/daily " activities as tolerated   3. Continue prenatal vitamins   4.  Follow-up in 4 weeks, sooner as needed.    Med reconciliation completed.        This document has been electronically signed by Tee Cervantes DO on September 5, 2019 1:57 PM

## 2020-10-28 ENCOUNTER — OFFICE VISIT (OUTPATIENT)
Dept: OBSTETRICS AND GYNECOLOGY | Facility: CLINIC | Age: 25
End: 2020-10-28

## 2020-10-28 VITALS
HEIGHT: 61 IN | WEIGHT: 172.8 LBS | SYSTOLIC BLOOD PRESSURE: 106 MMHG | BODY MASS INDEX: 32.62 KG/M2 | DIASTOLIC BLOOD PRESSURE: 70 MMHG

## 2020-10-28 DIAGNOSIS — Z30.09 UNWANTED FERTILITY: Primary | ICD-10-CM

## 2020-10-28 PROCEDURE — 99213 OFFICE O/P EST LOW 20 MIN: CPT | Performed by: OBSTETRICS & GYNECOLOGY

## 2020-10-28 RX ORDER — SODIUM CHLORIDE 0.9 % (FLUSH) 0.9 %
10 SYRINGE (ML) INJECTION AS NEEDED
Status: CANCELLED | OUTPATIENT
Start: 2020-10-28

## 2020-10-28 RX ORDER — SODIUM CHLORIDE, SODIUM LACTATE, POTASSIUM CHLORIDE, CALCIUM CHLORIDE 600; 310; 30; 20 MG/100ML; MG/100ML; MG/100ML; MG/100ML
125 INJECTION, SOLUTION INTRAVENOUS CONTINUOUS
Status: CANCELLED | OUTPATIENT
Start: 2020-10-28

## 2020-10-28 RX ORDER — NORGESTIMATE AND ETHINYL ESTRADIOL 0.25-0.035
1 KIT ORAL DAILY
Qty: 1 PACKAGE | Refills: 3 | Status: SHIPPED | OUTPATIENT
Start: 2020-10-28

## 2020-10-28 RX ORDER — SODIUM CHLORIDE 0.9 % (FLUSH) 0.9 %
3 SYRINGE (ML) INJECTION EVERY 12 HOURS SCHEDULED
Status: CANCELLED | OUTPATIENT
Start: 2020-10-28

## 2020-10-28 NOTE — PROGRESS NOTES
SUBJECTIVE:  Patient is a 25 y.o.  not currently using any form of contraception, who presents for counseling on tubal ligation. I discussed with the patient LARC methods of contraception to include IUD and Nexplanon which she is not interested in at all. I discussed the risk of regret with this type of surgery and she states that she is 100% certain that she is done having children. I explained that tubal ligation will not affect menses. I discussed the different forms of tubal ligation with the patient to include laparoscopic BTL and Salpingectomy. At this time the patient is interested in laparoscopic salpingectomy. All of the patient questions were answered to satisfaction. I spent greater than 50% of this appointment counseling the patient    Past Medical History:   Diagnosis Date   • Abdominal pain    • Acute bronchitis    • Acute gastroenteritis    • Acute tonsillitis    • Allergic rhinitis    • Anemia    • Common cold    • Cough    • Dysuria    • Eczema    • Encounter for general counseling on prescription of oral contraceptives    • Fever    • Follow-up exam after treatment     encounter for follow-up exam after completed treatment for conditions other than malignant neoplasm   • Headache    • Health maintenance examination     individual health exam   • Helminth infection     possible roundworm   • History of chicken pox    • Infection of skin     localized left ear   • Infectious gastroenteritis    • Marijuana abuse in remission 2019   • Right lower quadrant pain    • Streptococcal sore throat    • Surveillance of implantable subdermal contraceptive     .   • Upper respiratory infection    • Vaginal discharge    • Varicella      Past Surgical History:   Procedure Laterality Date   •  SECTION N/A 2017    Procedure:  SECTION REPEAT;  Surgeon: Homero Humphreys MD;  Location: [facility] LABOR DELIVERY;  Service:    •  SECTION N/A 2019    Procedure:  SECTION  REPEAT;  Surgeon: Tee Cervantes DO;  Location: [facility] LABOR DELIVERY;  Service: Obstetrics   •  SECTION PRIMARY  2011   • INJECTION OF MEDICATION  2013    toradol   • WISDOM TOOTH EXTRACTION       # 1 - Date: 11, Sex: Male, Weight: 3317 g (7 lb 5 oz), GA: 41w0d, Delivery: , Low Transverse, Apgar1: None, Apgar5: None, Living: Living, Birth Comments: None    # 2 - Date: 17, Sex: Female, Weight: 3260 g (7 lb 3 oz), GA: 39w1d, Delivery: , Low Transverse, Apgar1: 9, Apgar5: 9, Living: Living, Birth Comments: None    # 3 - Date: 19, Sex: Male, Weight: 2551 g (5 lb 10 oz), GA: 39w0d, Delivery: , Low Transverse, Apgar1: 9, Apgar5: 9, Living: Living, Birth Comments: NMSS - equivocal for TSH - repeat ordered, also elevated IRT with negative mutations for CF    GYN:  history of abnormal paps. Denies STD’s  Social History     Socioeconomic History   • Marital status: Single     Spouse name: Not on file   • Number of children: Not on file   • Years of education: Not on file   • Highest education level: Not on file   Tobacco Use   • Smoking status: Former Smoker     Packs/day: 0.25     Years: 2.00     Pack years: 0.50     Types: Cigarettes   • Smokeless tobacco: Never Used   • Tobacco comment: pt declined   Substance and Sexual Activity   • Alcohol use: Yes     Comment: on special occasions   • Drug use: Yes     Types: Marijuana     Comment: 1-2 times per month; quit in 2019   • Sexual activity: Yes     Partners: Male     Birth control/protection: None     Comment: last pap smear 2016 negative      family history includes Anemia in her mother; Autism in her brother; Breast cancer in her maternal grandmother; Cancer in an other family member; Cholelithiasis in an other family member; Diabetes in an other family member; Heart disease in an other family member; Hypertension in an other family member; No Known Problems in her brother, brother,  "brother, brother, daughter, father, sister, sister, and son; Thyroid disease in her maternal grandmother and another family member; Ulcers in an other family member.  No current outpatient medications on file.  All: NKDA  OBJECTIVE:  /70   Ht 154.9 cm (61\")   Wt 78.4 kg (172 lb 12.8 oz)   BMI 32.65 kg/m²     Mental:  AAOx3    ASSESSMENT:   25 y.o.  with undesired future fertility desiring permanent sterilization, will schedule patient on  for bilateral salpingectomy.  Patient not on birth control currently but would like to start until she is able to get her tubes tied.  Will place oral contraceptive pills.  Patient to return to see me in 4 weeks for preop visit, sooner as needed.        This document has been electronically signed by Tee Cervantes DO on 2020 16:06 CDT  "

## 2020-10-29 PROBLEM — Z30.09 UNWANTED FERTILITY: Status: ACTIVE | Noted: 2020-10-29

## 2020-11-24 ENCOUNTER — OFFICE VISIT (OUTPATIENT)
Dept: OBSTETRICS AND GYNECOLOGY | Facility: CLINIC | Age: 25
End: 2020-11-24

## 2020-11-24 VITALS
DIASTOLIC BLOOD PRESSURE: 66 MMHG | WEIGHT: 172.4 LBS | HEIGHT: 61 IN | SYSTOLIC BLOOD PRESSURE: 102 MMHG | BODY MASS INDEX: 32.55 KG/M2

## 2020-11-24 DIAGNOSIS — Z30.09 UNWANTED FERTILITY: Primary | ICD-10-CM

## 2020-11-24 PROCEDURE — 99214 OFFICE O/P EST MOD 30 MIN: CPT | Performed by: OBSTETRICS & GYNECOLOGY

## 2020-11-25 NOTE — PROGRESS NOTES
Jo Ybarra is a 25 y.o. y/o female.     Chief Complaint: Unwanted fertility, preop visit    HPI:   25 y.o. .  No LMP recorded..  Patient presents for preop visit prior to bilateral salpingectomy.  Patient undergo surgery on .  Patient still strongly desires to have tubes tied does not want any further children at this time.  No other concerns or complaints at this time.    Patient requests tubal sterilization by laparoscopy. She understands the short term risk of procedure to include death. She understands the risk of damage to bowel with risk of colostomy. She understands the risk of damage to the bowel not recognized at time of procedure with sepsis and/or need to return to OR.  I discussed the risk of bleeding with the patient and possible risk of blood transfusion.  Blood products carry risk of HIV, infection, hepatitis, and transfusion reaction. Patient is willing to accept blood products. She understands the risk of damage to bladder and ureters possible need for additional surgery. She understands the need for possible laparotomy for complications if unable to perform laparoscopically. She understands the risk of infection in the abdominal wall, pelvis, abdomen and other parts of the body. She understands how her medical history and surgical history affects the likelihood of this happening.     She understands the long-term risk of sterilization including but not limited to: failure, intended to be permanent with possible regret, increased risk of menorrhagia and pelvic pain. That if there is a failure it is more likely it could be an ectopic pregnancy. Patient expressed understanding and desires to proceed with surgery.    Note from last visit: Patient is a 25 y.o.  not currently using any form of contraception, who presents for counseling on tubal ligation. I discussed with the patient LARC methods of contraception to include IUD and Nexplanon which she is not interested in  at all. I discussed the risk of regret with this type of surgery and she states that she is 100% certain that she is done having children. I explained that tubal ligation will not affect menses. I discussed the different forms of tubal ligation with the patient to include laparoscopic BTL and Salpingectomy. At this time the patient is interested in laparoscopic salpingectomy. All of the patient questions were answered to satisfaction.     Review of Systems   Constitutional: Negative for chills, fatigue and fever.   HENT: Negative for sore throat.    Eyes: Negative for visual disturbance.   Respiratory: Negative for cough, shortness of breath and wheezing.    Cardiovascular: Negative for chest pain, palpitations and leg swelling.   Gastrointestinal: Negative for abdominal pain, diarrhea, nausea and vomiting.   Genitourinary: Negative for dysuria, flank pain, frequency, vaginal bleeding, vaginal discharge and vaginal pain.   Neurological: Negative for syncope, light-headedness and headaches.   Psychiatric/Behavioral: Negative for dysphoric mood and suicidal ideas. The patient is not nervous/anxious.         The following portions of the patient's history were reviewed and updated as appropriate: allergies, current medications, past family history, past medical history, past social history, past surgical history and problem list.    No Known Allergies     Prior to Admission medications    Medication Sig Start Date End Date Taking? Authorizing Provider   norgestimate-ethinyl estradiol (ORTHO-CYCLEN) 0.25-35 MG-MCG per tablet Take 1 tablet by mouth Daily. 10/28/20   Tee Cervantes, DO        The patient has a family history of   Family History   Problem Relation Age of Onset   • Cholelithiasis Other    • Ulcers Other    • Cancer Other    • Diabetes Other    • Heart disease Other    • Hypertension Other    • Thyroid disease Other         disorder   • No Known Problems Father    • Anemia Mother    • No Known  Problems Brother    • No Known Problems Sister    • No Known Problems Son    • No Known Problems Daughter    • Breast cancer Maternal Grandmother    • Thyroid disease Maternal Grandmother    • No Known Problems Brother    • No Known Problems Brother    • No Known Problems Brother    • Autism Brother    • No Known Problems Sister         Past Medical History:   Diagnosis Date   • Abdominal pain    • Acute bronchitis    • Acute gastroenteritis    • Acute tonsillitis    • Allergic rhinitis    • Anemia    • Common cold    • Cough    • Dysuria    • Eczema    • Encounter for general counseling on prescription of oral contraceptives    • Fever    • Follow-up exam after treatment     encounter for follow-up exam after completed treatment for conditions other than malignant neoplasm   • Headache    • Health maintenance examination     individual health exam   • Helminth infection     possible roundworm   • History of chicken pox    • Infection of skin     localized left ear   • Infectious gastroenteritis    • Marijuana abuse in remission 2019   • Right lower quadrant pain    • Streptococcal sore throat    • Surveillance of implantable subdermal contraceptive     .   • Upper respiratory infection    • Vaginal discharge    • Varicella         OB History        3    Para   3    Term   3            AB        Living   3       SAB        TAB        Ectopic        Molar        Multiple   0    Live Births   3                 Social History     Socioeconomic History   • Marital status: Single     Spouse name: Not on file   • Number of children: Not on file   • Years of education: Not on file   • Highest education level: Not on file   Tobacco Use   • Smoking status: Former Smoker     Packs/day: 0.25     Years: 2.00     Pack years: 0.50     Types: Cigarettes   • Smokeless tobacco: Never Used   • Tobacco comment: pt declined   Substance and Sexual Activity   • Alcohol use: Yes     Comment: on special occasions   • Drug  "use: Yes     Types: Marijuana     Comment: 1-2 times per month; quit in 2019   • Sexual activity: Yes     Partners: Male     Birth control/protection: None     Comment: last pap smear 2016 negative         Past Surgical History:   Procedure Laterality Date   •  SECTION N/A 2017    Procedure:  SECTION REPEAT;  Surgeon: Homero Humphreys MD;  Location: [facility] LABOR DELIVERY;  Service:    •  SECTION N/A 2019    Procedure:  SECTION REPEAT;  Surgeon: Tee Cervantes DO;  Location: [facility] LABOR DELIVERY;  Service: Obstetrics   •  SECTION PRIMARY  2011   • INJECTION OF MEDICATION  2013    toradol   • WISDOM TOOTH EXTRACTION          Patient Active Problem List   Diagnosis   • Unwanted fertility        Documented Vitals    20 1551   BP: 102/66   Weight: 78.2 kg (172 lb 6.4 oz)   Height: 154.9 cm (61\")        Body mass index is 32.57 kg/m².    Physical Exam  Vitals signs reviewed.   Constitutional:       General: She is not in acute distress.     Appearance: Normal appearance. She is well-developed. She is not ill-appearing, toxic-appearing or diaphoretic.   HENT:      Head: Normocephalic.   Neck:      Thyroid: No thyromegaly.   Cardiovascular:      Rate and Rhythm: Normal rate and regular rhythm.      Heart sounds: Normal heart sounds. No murmur.   Pulmonary:      Effort: Pulmonary effort is normal. No respiratory distress.      Breath sounds: Normal breath sounds. No wheezing.   Abdominal:      General: Bowel sounds are normal. There is no distension.      Palpations: Abdomen is soft. There is no mass.      Tenderness: There is no abdominal tenderness. There is no guarding or rebound.   Genitourinary:     Vagina: Normal.   Musculoskeletal: Normal range of motion.         General: No tenderness or deformity.   Skin:     General: Skin is warm and dry.      Findings: No erythema.   Neurological:      Mental Status: She is alert and " oriented to person, place, and time.   Psychiatric:         Behavior: Behavior normal.         Thought Content: Thought content normal.         Judgment: Judgment normal.         Laboratory Data:   No results for input(s): GLUCOSE, BUN, CREATININE, NA, K, CL, CO2, CALCIUM, PROTEINTOT, ALBUMIN, ALT, AST, ALKPHOS, BILITOT, EGFRIFNONA, GLOB, AGRATIO, BCR, ANIONGAP, BILIDIR, INDBILI in the last 27406 hours.  Lab Results - Last 18 Months   Lab Units 08/30/19  0522 08/29/19  1007 07/22/19  1551   WBC 10*3/mm3 13.93* 11.21* 12.53*   RBC 10*6/mm3 3.45* 4.11 3.87   HEMOGLOBIN g/dL 8.6* 10.4* 10.1*   HEMATOCRIT % 27.1* 32.3* 31.3*   MCV fL 78.6* 78.6* 80.9   MCH pg 24.9* 25.3* 26.1*   MCHC g/dL 31.7 32.2 32.3   RDW % 13.8 13.9 13.2   RDW-SD fl 39.4 39.3 38.4   MPV fL 11.8 11.8 13.1*   PLATELETS 10*3/mm3 203 258 239     No results for input(s): HCGQUAL in the last 70496 hours.    Assessment   Doing well without complaints at this time.  Patient desiring bilateral salpingectomy due to unwanted fertility.  Patient undergo surgery on 14 December.  Patient is return to see me sooner as needed.   Diagnosis Plan   1. Unwanted fertility             This document has been electronically signed by Tee Cervantes DO on November 25, 2020 07:34 CST

## 2022-10-17 ENCOUNTER — HOSPITAL ENCOUNTER (EMERGENCY)
Facility: HOSPITAL | Age: 27
Discharge: HOME | End: 2022-10-17
Attending: EMERGENCY MEDICINE | Admitting: EMERGENCY MEDICINE

## 2022-10-17 ENCOUNTER — APPOINTMENT (OUTPATIENT)
Dept: GENERAL RADIOLOGY | Facility: HOSPITAL | Age: 27
End: 2022-10-17

## 2022-10-17 VITALS
WEIGHT: 160 LBS | TEMPERATURE: 98.4 F | DIASTOLIC BLOOD PRESSURE: 77 MMHG | SYSTOLIC BLOOD PRESSURE: 130 MMHG | HEIGHT: 61 IN | OXYGEN SATURATION: 99 % | BODY MASS INDEX: 30.21 KG/M2 | HEART RATE: 74 BPM | RESPIRATION RATE: 18 BRPM

## 2022-10-17 DIAGNOSIS — K02.9 PAIN DUE TO DENTAL CARIES: ICD-10-CM

## 2022-10-17 DIAGNOSIS — R10.13 EPIGASTRIC PAIN: Primary | ICD-10-CM

## 2022-10-17 LAB
ALBUMIN SERPL-MCNC: 4.4 G/DL (ref 3.5–5.2)
ALBUMIN/GLOB SERPL: 1.4 G/DL
ALP SERPL-CCNC: 62 U/L (ref 39–117)
ALT SERPL W P-5'-P-CCNC: 13 U/L (ref 1–33)
ANION GAP SERPL CALCULATED.3IONS-SCNC: 10 MMOL/L (ref 5–15)
AST SERPL-CCNC: 23 U/L (ref 1–32)
BASOPHILS # BLD AUTO: 0.07 10*3/MM3 (ref 0–0.2)
BASOPHILS NFR BLD AUTO: 0.7 % (ref 0–1.5)
BILIRUB SERPL-MCNC: 0.6 MG/DL (ref 0–1.2)
BUN SERPL-MCNC: 13 MG/DL (ref 6–20)
BUN/CREAT SERPL: 16 (ref 7–25)
CALCIUM SPEC-SCNC: 9.3 MG/DL (ref 8.6–10.5)
CHLORIDE SERPL-SCNC: 107 MMOL/L (ref 98–107)
CO2 SERPL-SCNC: 25 MMOL/L (ref 22–29)
CREAT SERPL-MCNC: 0.81 MG/DL (ref 0.57–1)
DEPRECATED RDW RBC AUTO: 47.4 FL (ref 37–54)
EGFRCR SERPLBLD CKD-EPI 2021: 102.2 ML/MIN/1.73
EOSINOPHIL # BLD AUTO: 0.22 10*3/MM3 (ref 0–0.4)
EOSINOPHIL # BLD MANUAL: 0.19 10*3/MM3 (ref 0–0.4)
EOSINOPHIL NFR BLD AUTO: 2.3 % (ref 0.3–6.2)
EOSINOPHIL NFR BLD MANUAL: 2 % (ref 0.3–6.2)
ERYTHROCYTE [DISTWIDTH] IN BLOOD BY AUTOMATED COUNT: 15.1 % (ref 12.3–15.4)
GLOBULIN UR ELPH-MCNC: 3.1 GM/DL
GLUCOSE SERPL-MCNC: 119 MG/DL (ref 65–99)
HCG SERPL QL: NEGATIVE
HCT VFR BLD AUTO: 38.4 % (ref 34–46.6)
HGB BLD-MCNC: 12 G/DL (ref 12–15.9)
IMM GRANULOCYTES # BLD AUTO: 0.02 10*3/MM3 (ref 0–0.05)
IMM GRANULOCYTES NFR BLD AUTO: 0.2 % (ref 0–0.5)
LIPASE SERPL-CCNC: 21 U/L (ref 13–60)
LYMPHOCYTES # BLD AUTO: 3.33 10*3/MM3 (ref 0.7–3.1)
LYMPHOCYTES # BLD MANUAL: 3.31 10*3/MM3 (ref 0.7–3.1)
LYMPHOCYTES NFR BLD AUTO: 34.2 % (ref 19.6–45.3)
LYMPHOCYTES NFR BLD MANUAL: 8 % (ref 5–12)
MCH RBC QN AUTO: 26.8 PG (ref 26.6–33)
MCHC RBC AUTO-ENTMCNC: 31.3 G/DL (ref 31.5–35.7)
MCV RBC AUTO: 85.9 FL (ref 79–97)
MONOCYTES # BLD AUTO: 1.04 10*3/MM3 (ref 0.1–0.9)
MONOCYTES # BLD: 0.78 10*3/MM3 (ref 0.1–0.9)
MONOCYTES NFR BLD AUTO: 10.7 % (ref 5–12)
NEUTROPHILS # BLD AUTO: 5.45 10*3/MM3 (ref 1.7–7)
NEUTROPHILS NFR BLD AUTO: 5.06 10*3/MM3 (ref 1.7–7)
NEUTROPHILS NFR BLD AUTO: 51.9 % (ref 42.7–76)
NEUTROPHILS NFR BLD MANUAL: 56 % (ref 42.7–76)
NRBC BLD AUTO-RTO: 0 /100 WBC (ref 0–0.2)
PLAT MORPH BLD: NORMAL
PLATELET # BLD AUTO: 283 10*3/MM3 (ref 140–450)
PMV BLD AUTO: 9.9 FL (ref 6–12)
POTASSIUM SERPL-SCNC: 3.5 MMOL/L (ref 3.5–5.2)
PROT SERPL-MCNC: 7.5 G/DL (ref 6–8.5)
RBC # BLD AUTO: 4.47 10*6/MM3 (ref 3.77–5.28)
RBC MORPH BLD: NORMAL
SODIUM SERPL-SCNC: 142 MMOL/L (ref 136–145)
TROPONIN T SERPL-MCNC: <0.01 NG/ML (ref 0–0.03)
VARIANT LYMPHS NFR BLD MANUAL: 11 % (ref 0–5)
VARIANT LYMPHS NFR BLD MANUAL: 23 % (ref 19.6–45.3)
WBC MORPH BLD: NORMAL
WBC NRBC COR # BLD: 9.74 10*3/MM3 (ref 3.4–10.8)
WHOLE BLOOD HOLD COAG: NORMAL

## 2022-10-17 PROCEDURE — 96374 THER/PROPH/DIAG INJ IV PUSH: CPT

## 2022-10-17 PROCEDURE — 25010000002 ONDANSETRON PER 1 MG: Performed by: EMERGENCY MEDICINE

## 2022-10-17 PROCEDURE — 84703 CHORIONIC GONADOTROPIN ASSAY: CPT | Performed by: EMERGENCY MEDICINE

## 2022-10-17 PROCEDURE — 83690 ASSAY OF LIPASE: CPT | Performed by: EMERGENCY MEDICINE

## 2022-10-17 PROCEDURE — 85025 COMPLETE CBC W/AUTO DIFF WBC: CPT | Performed by: EMERGENCY MEDICINE

## 2022-10-17 PROCEDURE — 93005 ELECTROCARDIOGRAM TRACING: CPT

## 2022-10-17 PROCEDURE — 84484 ASSAY OF TROPONIN QUANT: CPT | Performed by: EMERGENCY MEDICINE

## 2022-10-17 PROCEDURE — 80053 COMPREHEN METABOLIC PANEL: CPT | Performed by: EMERGENCY MEDICINE

## 2022-10-17 PROCEDURE — 85007 BL SMEAR W/DIFF WBC COUNT: CPT | Performed by: EMERGENCY MEDICINE

## 2022-10-17 PROCEDURE — 93010 ELECTROCARDIOGRAM REPORT: CPT | Performed by: INTERNAL MEDICINE

## 2022-10-17 PROCEDURE — 71045 X-RAY EXAM CHEST 1 VIEW: CPT

## 2022-10-17 PROCEDURE — 96375 TX/PRO/DX INJ NEW DRUG ADDON: CPT

## 2022-10-17 PROCEDURE — 99283 EMERGENCY DEPT VISIT LOW MDM: CPT

## 2022-10-17 RX ORDER — ACETAMINOPHEN 500 MG
1000 TABLET ORAL EVERY 6 HOURS PRN
Qty: 30 TABLET | Refills: 0 | Status: SHIPPED | OUTPATIENT
Start: 2022-10-17

## 2022-10-17 RX ORDER — ALUMINA, MAGNESIA, AND SIMETHICONE 2400; 2400; 240 MG/30ML; MG/30ML; MG/30ML
15 SUSPENSION ORAL ONCE
Status: COMPLETED | OUTPATIENT
Start: 2022-10-17 | End: 2022-10-17

## 2022-10-17 RX ORDER — OMEPRAZOLE 40 MG/1
40 CAPSULE, DELAYED RELEASE ORAL DAILY
Qty: 30 CAPSULE | Refills: 1 | Status: SHIPPED | OUTPATIENT
Start: 2022-10-17

## 2022-10-17 RX ORDER — ONDANSETRON 2 MG/ML
4 INJECTION INTRAMUSCULAR; INTRAVENOUS ONCE
Status: COMPLETED | OUTPATIENT
Start: 2022-10-17 | End: 2022-10-17

## 2022-10-17 RX ORDER — PANTOPRAZOLE SODIUM 40 MG/10ML
40 INJECTION, POWDER, LYOPHILIZED, FOR SOLUTION INTRAVENOUS ONCE
Status: COMPLETED | OUTPATIENT
Start: 2022-10-17 | End: 2022-10-17

## 2022-10-17 RX ORDER — LIDOCAINE HYDROCHLORIDE 20 MG/ML
15 SOLUTION OROPHARYNGEAL ONCE
Status: COMPLETED | OUTPATIENT
Start: 2022-10-17 | End: 2022-10-17

## 2022-10-17 RX ORDER — LIDOCAINE HYDROCHLORIDE 20 MG/ML
10 SOLUTION OROPHARYNGEAL
Qty: 100 ML | Refills: 2 | Status: SHIPPED | OUTPATIENT
Start: 2022-10-17

## 2022-10-17 RX ADMIN — ONDANSETRON HYDROCHLORIDE 4 MG: 2 INJECTION, SOLUTION INTRAMUSCULAR; INTRAVENOUS at 18:53

## 2022-10-17 RX ADMIN — ALUMINUM HYDROXIDE, MAGNESIUM HYDROXIDE, AND DIMETHICONE 15 ML: 400; 400; 40 SUSPENSION ORAL at 18:53

## 2022-10-17 RX ADMIN — PANTOPRAZOLE SODIUM 40 MG: 40 INJECTION, POWDER, FOR SOLUTION INTRAVENOUS at 18:52

## 2022-10-17 RX ADMIN — LIDOCAINE HYDROCHLORIDE 15 ML: 20 SOLUTION ORAL; TOPICAL at 18:53

## 2022-10-17 NOTE — ED PROVIDER NOTES
Subjective   History of Present Illness  In addition epigastric pain the patient also reports dental pain.  She has a right upper second molar that has been fractured and has chronic dental caries.  Due to her insurance situation she is not eligible to see a dentist in the near future and earliest appointments are in January.  Denies fevers or chills.  Denies facial swelling.  Does have pain exacerbated by eating and drinking.    Abdominal Pain  Pain location:  Epigastric  Pain quality: aching and burning    Pain radiates to:  Does not radiate  Pain severity:  Mild  Onset quality:  Gradual  Duration:  1 day  Timing:  Constant  Progression:  Waxing and waning  Chronicity:  New  Context: alcohol use    Context: not trauma    Context comment:  Heavy NSAID use  Relieved by:  Nothing  Worsened by:  Nothing  Ineffective treatments:  None tried  Associated symptoms: no chest pain, no cough, no diarrhea, no fever, no hematemesis and no shortness of breath    Risk factors: NSAID use        Review of Systems   Constitutional: Negative for fever.   Respiratory: Negative for cough and shortness of breath.    Cardiovascular: Negative for chest pain.   Gastrointestinal: Positive for abdominal pain. Negative for diarrhea and hematemesis.   All other systems reviewed and are negative.      Past Medical History:   Diagnosis Date   • Abdominal pain    • Acute bronchitis    • Acute gastroenteritis    • Acute tonsillitis    • Allergic rhinitis    • Anemia    • Common cold    • Cough    • Dysuria    • Eczema    • Encounter for general counseling on prescription of oral contraceptives    • Fever    • Follow-up exam after treatment     encounter for follow-up exam after completed treatment for conditions other than malignant neoplasm   • Headache    • Health maintenance examination     individual health exam   • Helminth infection     possible roundworm   • History of chicken pox    • Infection of skin     localized left ear   •  Infectious gastroenteritis    • Marijuana abuse in remission 2019   • Right lower quadrant pain    • Streptococcal sore throat    • Surveillance of implantable subdermal contraceptive     .   • Upper respiratory infection    • Vaginal discharge    • Varicella        No Known Allergies    Past Surgical History:   Procedure Laterality Date   •  SECTION N/A 2017    Procedure:  SECTION REPEAT;  Surgeon: Homero Humphreys MD;  Location: [facility] LABOR DELIVERY;  Service:    •  SECTION N/A 2019    Procedure:  SECTION REPEAT;  Surgeon: Tee Cervantes DO;  Location: [facility] LABOR DELIVERY;  Service: Obstetrics   •  SECTION PRIMARY  2011   • INJECTION OF MEDICATION  2013    toradol   • WISDOM TOOTH EXTRACTION         Family History   Problem Relation Age of Onset   • Cholelithiasis Other    • Ulcers Other    • Cancer Other    • Diabetes Other    • Heart disease Other    • Hypertension Other    • Thyroid disease Other         disorder   • No Known Problems Father    • Anemia Mother    • No Known Problems Brother    • No Known Problems Sister    • No Known Problems Son    • No Known Problems Daughter    • Breast cancer Maternal Grandmother    • Thyroid disease Maternal Grandmother    • No Known Problems Brother    • No Known Problems Brother    • No Known Problems Brother    • Autism Brother    • No Known Problems Sister        Social History     Socioeconomic History   • Marital status: Single   Tobacco Use   • Smoking status: Former     Packs/day: 0.25     Years: 2.00     Pack years: 0.50     Types: Cigarettes     Quit date: 10/2020     Years since quittin.0   • Smokeless tobacco: Never   Vaping Use   • Vaping Use: Never used   Substance and Sexual Activity   • Alcohol use: Yes     Comment: socially   • Drug use: Not Currently   • Sexual activity: Defer           Objective   Physical Exam  Vitals and nursing note reviewed.   Constitutional:        Appearance: She is not ill-appearing.   HENT:      Head: Normocephalic.   Neck:      Vascular: No JVD.   Cardiovascular:      Rate and Rhythm: Normal rate and regular rhythm.      Pulses:           Radial pulses are 2+ on the right side and 2+ on the left side.      Heart sounds: Normal heart sounds.   Pulmonary:      Effort: Pulmonary effort is normal.      Breath sounds: Normal breath sounds.   Chest:      Chest wall: No tenderness.   Abdominal:      Palpations: Abdomen is soft.      Tenderness: There is no abdominal tenderness.   Musculoskeletal:      Right lower leg: No tenderness. No edema.      Left lower leg: No tenderness. No edema.   Skin:     General: Skin is warm and dry.   Neurological:      Mental Status: She is alert and oriented to person, place, and time.   Psychiatric:         Behavior: Behavior normal.         Procedures           ED Course                      HEART Score: 1                      MDM  Number of Diagnoses or Management Options     Amount and/or Complexity of Data Reviewed  Clinical lab tests: reviewed  Tests in the radiology section of CPT®: reviewed  Tests in the medicine section of CPT®: reviewed  Decide to obtain previous medical records or to obtain history from someone other than the patient: yes  Obtain history from someone other than the patient: yes  Review and summarize past medical records: yes      EKG interpretation: Interpreted by myself.  Normal sinus rhythm.  Rate 69.  Normal P wave and SD interval.  Normal QRS and axis.  Normal QTc interval.  ST segments are normal.    The patient is having epigastric pain and not chest pain.  Risk factors include heavy NSAID use including daily accidental and mild overdoses of naproxen for the past couple of weeks as well as frequent alcohol use and spicy food intake.  No evidence of dental abscess or sepsis.  Will prescribe therapy for the short-term.  We will make sure the patient has a PCP for continued follow-up until she can  receive a dental appointment.  Final diagnoses:   Epigastric pain   Pain due to dental caries       ED Disposition  ED Disposition     ED Disposition   Discharge    Condition   Stable    Comment   --             Karmen Red MD  [address] [place] [address] [address]  [facility] [address] [phone]    In 3 days  For wound re-check and to discuss your stomach/chest pain    [facility] EMERGENCY DEPARTMENT  [address]  [facility] [address] [phone]    As needed, If symptoms worsen at any time    DENTIST  ASAP             Medication List      New Prescriptions    acetaminophen 500 MG tablet  Commonly known as: TYLENOL  Take 2 tablets by mouth Every 6 (Six) Hours As Needed for Mild Pain or Moderate Pain.     Lidocaine Viscous HCl 2 % solution  Commonly known as: XYLOCAINE  Take 10 mL by mouth 4 (Four) Times a Day Before Meals & at Bedtime As Needed for Mild Pain.     omeprazole 40 MG capsule  Commonly known as: priLOSEC  Take 1 capsule by mouth Daily.           Where to Get Your Medications      These medications were sent to [facility]/pharmacy #[id number] - [place], [place] - [address] [place] [address] - [phone]  - [phone] [address] [address]    Phone: [phone]   · acetaminophen 500 MG tablet  · Lidocaine Viscous HCl 2 % solution  · omeprazole 40 MG capsule          Cirilo Buitrago DO  10/18/22 0042

## 2022-10-18 NOTE — DISCHARGE INSTRUCTIONS
Please follow the diet and the instructions closely.  Do not drink alcohol.  Avoid medicines like naproxen/Advil/Motrin.  Do not eat spicy food.  Use the lidocaine solution to swish and swallow to help improve your dental pain to allow for eating and drinking and sleeping.  Follow-up with your family doctor closely until the dentist can take care of you.

## 2022-10-26 LAB
QT INTERVAL: 392 MS
QTC INTERVAL: 420 MS

## 2023-05-03 ENCOUNTER — OFFICE VISIT (OUTPATIENT)
Dept: OBSTETRICS AND GYNECOLOGY | Facility: CLINIC | Age: 28
End: 2023-05-03
Payer: COMMERCIAL

## 2023-05-03 VITALS
BODY MASS INDEX: 30.02 KG/M2 | WEIGHT: 159 LBS | HEIGHT: 61 IN | DIASTOLIC BLOOD PRESSURE: 78 MMHG | SYSTOLIC BLOOD PRESSURE: 122 MMHG

## 2023-05-03 DIAGNOSIS — N94.6 PAINFUL MENSTRUAL PERIODS: ICD-10-CM

## 2023-05-03 DIAGNOSIS — Z01.419 ENCOUNTER FOR WELL WOMAN EXAM WITH ROUTINE GYNECOLOGICAL EXAM: Primary | ICD-10-CM

## 2023-05-03 DIAGNOSIS — Z11.3 SCREENING EXAMINATION FOR STD (SEXUALLY TRANSMITTED DISEASE): ICD-10-CM

## 2023-05-03 DIAGNOSIS — N92.0 MENORRHAGIA WITH REGULAR CYCLE: ICD-10-CM

## 2023-05-03 LAB
CANDIDA ALBICANS: NEGATIVE
GARDNERELLA VAGINALIS: POSITIVE
T VAGINALIS DNA VAG QL PROBE+SIG AMP: NEGATIVE

## 2023-05-03 PROCEDURE — 87660 TRICHOMONAS VAGIN DIR PROBE: CPT

## 2023-05-03 PROCEDURE — 87491 CHLMYD TRACH DNA AMP PROBE: CPT

## 2023-05-03 PROCEDURE — 87661 TRICHOMONAS VAGINALIS AMPLIF: CPT

## 2023-05-03 PROCEDURE — 87591 N.GONORRHOEAE DNA AMP PROB: CPT

## 2023-05-03 PROCEDURE — 87510 GARDNER VAG DNA DIR PROBE: CPT

## 2023-05-03 PROCEDURE — 87480 CANDIDA DNA DIR PROBE: CPT

## 2023-05-03 RX ORDER — NORETHINDRONE ACETATE AND ETHINYL ESTRADIOL AND FERROUS FUMARATE 1MG-20(24)
1 KIT ORAL DAILY
Qty: 28 TABLET | Refills: 12 | Status: SHIPPED | OUTPATIENT
Start: 2023-05-03 | End: 2024-05-02

## 2023-05-03 NOTE — PROGRESS NOTES
Subjective   Jo Ybarra is a 27 y.o. Annual gynecological exam with painful periods     History of Present Illness  LMP: 4/22/23  Pap: 6/17/16, neg  BC: none    Patient presents today for an annual gynecological exam. She has complaints of heavy, painful periods. Reports monthly periods, bleeds for 5-6 days, passes dime size clots, associated bloating and cramping. Nothing seems to make the pain or bleeding better or worse   She would like STD testing today.       Gynecologic Exam  The patient's pertinent negatives include no genital itching, genital lesions, genital odor, genital rash, missed menses, pelvic pain, vaginal bleeding or vaginal discharge. Pertinent negatives include no abdominal pain, chills, constipation, diarrhea, dysuria, fever, flank pain, frequency, headaches, hematuria, nausea, urgency or vomiting. She uses nothing for contraception. Her menstrual history has been regular. Her past medical history is significant for menorrhagia.   Menorrhagia  This is a chronic problem. The current episode started more than 1 month ago. The problem occurs intermittently. The problem has been waxing and waning. Pertinent negatives include no abdominal pain, chest pain, chills, coughing, fatigue, fever, headaches, nausea, urinary symptoms or vomiting. Nothing aggravates the symptoms. She has tried nothing for the symptoms. The treatment provided no relief.       The following portions of the patient's history were reviewed and updated as appropriate: allergies, current medications, past family history, past medical history, past social history, past surgical history and problem list.    Review of Systems   Constitutional: Negative for appetite change, chills, fatigue and fever.   Respiratory: Negative for apnea, cough, choking, chest tightness, shortness of breath, wheezing and stridor.    Cardiovascular: Negative for chest pain, palpitations and leg swelling.   Gastrointestinal: Negative for abdominal  pain, constipation, diarrhea, nausea and vomiting.   Genitourinary: Positive for menorrhagia and menstrual problem. Negative for amenorrhea, breast discharge, breast lump, breast pain, decreased libido, decreased urine volume, difficulty urinating, dyspareunia, dysuria, flank pain, frequency, genital sores, hematuria, missed menses, pelvic pain, pelvic pressure, urgency, urinary incontinence, vaginal bleeding, vaginal discharge and vaginal pain.       Objective   Physical Exam  Vitals reviewed. Exam conducted with a chaperone present.   Constitutional:       General: She is awake. She is not in acute distress.     Appearance: Normal appearance. She is well-developed and normal weight. She is not ill-appearing, toxic-appearing or diaphoretic.   Cardiovascular:      Rate and Rhythm: Normal rate and regular rhythm.      Pulses: Normal pulses.      Heart sounds: Normal heart sounds.   Pulmonary:      Effort: Pulmonary effort is normal.      Breath sounds: Normal breath sounds.   Chest:   Breasts:     Nixon Score is 5.      Breasts are symmetrical.      Right: Normal. No swelling, bleeding, inverted nipple, mass, nipple discharge, skin change or tenderness.      Left: Normal. No swelling, bleeding, inverted nipple, mass, nipple discharge, skin change or tenderness.   Abdominal:      General: Bowel sounds are normal.      Hernia: There is no hernia in the left inguinal area or right inguinal area.   Genitourinary:     General: Normal vulva.      Exam position: Lithotomy position.      Pubic Area: No rash or pubic lice.       Nixon stage (genital): 5.      Labia:         Right: No rash, tenderness, lesion or injury.         Left: No rash, tenderness, lesion or injury.       Vagina: No signs of injury and foreign body. Vaginal discharge (white, thin) present. No erythema, tenderness, bleeding, lesions or prolapsed vaginal walls.      Cervix: Friability present. No cervical motion tenderness, discharge, lesion, erythema,  cervical bleeding or eversion.      Uterus: Normal.       Adnexa: Right adnexa normal and left adnexa normal.      Comments: Pap collected  Vag panel collected  GCT collected   Lymphadenopathy:      Lower Body: No right inguinal adenopathy. No left inguinal adenopathy.   Skin:     General: Skin is warm and dry.   Neurological:      Mental Status: She is alert and easily aroused.   Psychiatric:         Behavior: Behavior is cooperative.           Assessment & Plan   Diagnoses and all orders for this visit:    1. Encounter for well woman exam with routine gynecological exam (Primary)  -     LIQUID-BASED PAP SMEAR WITH HPV GENOTYPING IF ASCUS (ANGELA,COR,MAD)    2. Screening examination for STD (sexually transmitted disease)  -     Gardnerella vaginalis, Trichomonas vaginalis, Candida albicans, DNA - Swab, Vagina; Future  -     Chlamydia trachomatis, Neisseria gonorrhoeae, Trichomonas vaginalis, PCR - Swab, Cervix; Future  -     Gardnerella vaginalis, Trichomonas vaginalis, Candida albicans, DNA - Swab, Vagina  -     Chlamydia trachomatis, Neisseria gonorrhoeae, Trichomonas vaginalis, PCR - Swab, Cervix    3. Menorrhagia with regular cycle    4. Painful menstrual periods    Reviewed preventative screening recommendations. Patient educated and encouraged to do monthly self breast exams. If pap smear is normal patient will receive a letter in the mail in about two weeks. If pap smear is abnormal we will call the patient and follow up with a plan. If pap smear is normal recommend to repeat pap in 3 years.    Discussed all forms of birth control with the patient including R&B and s/e of each. She has chosen to proceed with OCPs. Recommend taking the pill every day around the same time every day. If you miss a dose, take the missed dose as soon as you remember. Recommend condom use with all sexual encounters.   Will notify pt of lab results when available.         This document has been electronically signed by Almita Pedraza  APRN on May 3, 2023 11:23 CDT

## 2023-05-04 LAB
C TRACH RRNA CVX QL NAA+PROBE: POSITIVE
N GONORRHOEA RRNA SPEC QL NAA+PROBE: NEGATIVE
REF LAB TEST METHOD: NORMAL
TRICHOMONAS VAGINALIS PCR: POSITIVE

## 2023-05-04 RX ORDER — DOXYCYCLINE HYCLATE 100 MG/1
100 CAPSULE ORAL 2 TIMES DAILY
Qty: 14 CAPSULE | Refills: 0 | Status: SHIPPED | OUTPATIENT
Start: 2023-05-04 | End: 2023-05-11

## 2023-05-04 RX ORDER — METRONIDAZOLE 500 MG/1
2000 TABLET ORAL ONCE
Qty: 4 TABLET | Refills: 0 | Status: SHIPPED | OUTPATIENT
Start: 2023-05-04 | End: 2023-05-04

## 2023-05-04 RX ORDER — FLUCONAZOLE 150 MG/1
TABLET ORAL
Qty: 2 TABLET | Refills: 0 | Status: SHIPPED | OUTPATIENT
Start: 2023-05-04
